# Patient Record
Sex: MALE | Race: WHITE | NOT HISPANIC OR LATINO | Employment: UNEMPLOYED | URBAN - METROPOLITAN AREA
[De-identification: names, ages, dates, MRNs, and addresses within clinical notes are randomized per-mention and may not be internally consistent; named-entity substitution may affect disease eponyms.]

---

## 2018-12-06 ENCOUNTER — HOSPITAL ENCOUNTER (EMERGENCY)
Facility: MEDICAL CENTER | Age: 58
End: 2018-12-06
Attending: EMERGENCY MEDICINE
Payer: MEDICAID

## 2018-12-06 VITALS
WEIGHT: 222 LBS | HEIGHT: 68 IN | HEART RATE: 90 BPM | TEMPERATURE: 97.3 F | OXYGEN SATURATION: 95 % | RESPIRATION RATE: 18 BRPM | SYSTOLIC BLOOD PRESSURE: 126 MMHG | BODY MASS INDEX: 33.65 KG/M2 | DIASTOLIC BLOOD PRESSURE: 91 MMHG

## 2018-12-06 DIAGNOSIS — J02.9 PHARYNGITIS, UNSPECIFIED ETIOLOGY: ICD-10-CM

## 2018-12-06 LAB
S PYO AG THROAT QL: NORMAL
SIGNIFICANT IND 70042: NORMAL
SITE SITE: NORMAL
SOURCE SOURCE: NORMAL

## 2018-12-06 PROCEDURE — A9270 NON-COVERED ITEM OR SERVICE: HCPCS | Performed by: EMERGENCY MEDICINE

## 2018-12-06 PROCEDURE — 87880 STREP A ASSAY W/OPTIC: CPT

## 2018-12-06 PROCEDURE — 87077 CULTURE AEROBIC IDENTIFY: CPT

## 2018-12-06 PROCEDURE — 700102 HCHG RX REV CODE 250 W/ 637 OVERRIDE(OP): Performed by: EMERGENCY MEDICINE

## 2018-12-06 PROCEDURE — 99284 EMERGENCY DEPT VISIT MOD MDM: CPT

## 2018-12-06 PROCEDURE — 96372 THER/PROPH/DIAG INJ SC/IM: CPT

## 2018-12-06 PROCEDURE — 700111 HCHG RX REV CODE 636 W/ 250 OVERRIDE (IP): Performed by: EMERGENCY MEDICINE

## 2018-12-06 PROCEDURE — 87081 CULTURE SCREEN ONLY: CPT

## 2018-12-06 RX ORDER — DEXAMETHASONE 4 MG/1
8 TABLET ORAL ONCE
Status: COMPLETED | OUTPATIENT
Start: 2018-12-06 | End: 2018-12-06

## 2018-12-06 RX ADMIN — PENICILLIN G BENZATHINE 1.2 MILLION UNITS: 1200000 INJECTION, SUSPENSION INTRAMUSCULAR at 11:52

## 2018-12-06 RX ADMIN — DEXAMETHASONE 8 MG: 4 TABLET ORAL at 11:52

## 2018-12-06 NOTE — DISCHARGE INSTRUCTIONS
Pharyngitis  Pharyngitis is redness, pain, and swelling (inflammation) of your pharynx.  What are the causes?  Pharyngitis is usually caused by infection. Most of the time, these infections are from viruses (viral) and are part of a cold. However, sometimes pharyngitis is caused by bacteria (bacterial). Pharyngitis can also be caused by allergies. Viral pharyngitis may be spread from person to person by coughing, sneezing, and personal items or utensils (cups, forks, spoons, toothbrushes). Bacterial pharyngitis may be spread from person to person by more intimate contact, such as kissing.  What are the signs or symptoms?  Symptoms of pharyngitis include:  · Sore throat.  · Tiredness (fatigue).  · Low-grade fever.  · Headache.  · Joint pain and muscle aches.  · Skin rashes.  · Swollen lymph nodes.  · Plaque-like film on throat or tonsils (often seen with bacterial pharyngitis).  How is this diagnosed?  Your health care provider will ask you questions about your illness and your symptoms. Your medical history, along with a physical exam, is often all that is needed to diagnose pharyngitis. Sometimes, a rapid strep test is done. Other lab tests may also be done, depending on the suspected cause.  How is this treated?  Viral pharyngitis will usually get better in 3-4 days without the use of medicine. Bacterial pharyngitis is treated with medicines that kill germs (antibiotics).  Follow these instructions at home:  · Drink enough water and fluids to keep your urine clear or pale yellow.  · Only take over-the-counter or prescription medicines as directed by your health care provider:  ¨ If you are prescribed antibiotics, make sure you finish them even if you start to feel better.  ¨ Do not take aspirin.  · Get lots of rest.  · Gargle with 8 oz of salt water (½ tsp of salt per 1 qt of water) as often as every 1-2 hours to soothe your throat.  · Throat lozenges (if you are not at risk for choking) or sprays may be used to  soothe your throat.  Contact a health care provider if:  · You have large, tender lumps in your neck.  · You have a rash.  · You cough up green, yellow-brown, or bloody spit.  Get help right away if:  · Your neck becomes stiff.  · You drool or are unable to swallow liquids.  · You vomit or are unable to keep medicines or liquids down.  · You have severe pain that does not go away with the use of recommended medicines.  · You have trouble breathing (not caused by a stuffy nose).  This information is not intended to replace advice given to you by your health care provider. Make sure you discuss any questions you have with your health care provider.  Document Released: 12/18/2006 Document Revised: 05/25/2017 Document Reviewed: 08/25/2014  [employer] Interactive Patient Education © 2017 [employer]
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
## 2018-12-06 NOTE — LETTER
12/9/2018               Frederic You  [address] [place] [id number]        Dear Frederic (MR#[id number])    This letter is sent in regards to your, recent visit to the [facility] Emergency Department on 12/6/2018.  During the visit, tests were performed to assist the physician in a medical diagnosis.  A review of those tests requires that we notify you of the following:    Your throat culture was POSITIVE for a bacteria called Group C Streptococcus. The antibiotic administered to you, Penicillin G Benzathine, should be active to treat this bacteria. If you are not improving, or your signs worsen, then please seek medical attention.      Please feel free to contact me at the number below if you have any questions or concerns. Thank you for your cooperation in the matter.    Sincerely,  ED Culture Follow-Up Staff  Kenzie Griffin, PharmD    [facility], Emergency Department  [facility], [place] [id number] [phone]

## 2018-12-06 NOTE — ED NOTES
L side throat edematous, red. Pt states he has difficulty swallowing x 3 days. NAD noted. Denies difficulty breathing. Speaking in full sentences.

## 2018-12-09 LAB
S PYO SPEC QL CULT: ABNORMAL
S PYO SPEC QL CULT: ABNORMAL
SIGNIFICANT IND 70042: ABNORMAL
SITE SITE: ABNORMAL
SOURCE SOURCE: ABNORMAL

## 2018-12-09 NOTE — ED NOTES
"ED Positive Culture Follow-up/Notification Note:    Date: 12/9/18     Patient seen in the ED on 12/6/2018 for sore throat starting 5 days prior to presentation.   1. Pharyngitis, unspecified etiology       Patient received penicillin G benzathine 1.2 Million Units intramuscularly before discharge.  Discharge Medication List as of 12/6/2018 12:21 PM          Allergies: Patient has no known allergies.     Vitals:    12/06/18 0929 12/06/18 0930 12/06/18 1200   BP: 128/95  126/91   Pulse: 94  90   Resp: 18  18   Temp: (!) 35.2 °C (95.4 °F)  36.3 °C (97.3 °F)   TempSrc: Temporal     SpO2: 95%     Weight:  100.7 kg (222 lb 0.1 oz)    Height: 1.727 m (5' 8\")         Final cultures:   Results     Procedure Component Value Units Date/Time    RAPID STREP, CULT IF INDICATED (CULTURE IF NEGATIVE) [[id number]] Collected:  12/06/18 1030    Order Status:  Completed Specimen:  Throat from Throat Updated:  12/09/18 1230     Significant Indicator NEG     Source THRT     Site THROAT     Rapid Strep Screen Negative for Group A streptococcus.  A negative result may be obtained if the specimen is  inadequate or antigen concentration is below the  sensitivity of the test. This negative test will be followed  up with a culture as requested.      BETA STREP SCREEN (GP. A) [[id number]]  (Abnormal) Collected:  12/06/18 1030    Order Status:  Completed Specimen:  Throat Updated:  12/09/18 1230     Significant Indicator POS (POS)     Source THRT     Site THROAT     Beta Strep Screen Group A Growth noted after further incubation, see below for  organism identification.   (A)      Beta Streptococcus Group G  Moderate growth   (A)          Plan:   Appropriate antibiotic therapy prescribed. No changes required based upon culture result.  I sent letter to patient to notify of positive culture result and recommend additional medical treatment if the patient has not improved because this pathogen result is not always the true pathogen and can be " commensal francisco.     Kenzie Griffin, PharmD

## 2019-08-18 ENCOUNTER — HOSPITAL ENCOUNTER (EMERGENCY)
Facility: MEDICAL CENTER | Age: 59
End: 2019-08-18
Attending: EMERGENCY MEDICINE
Payer: MEDICAID

## 2019-08-18 VITALS
RESPIRATION RATE: 16 BRPM | HEIGHT: 68 IN | DIASTOLIC BLOOD PRESSURE: 88 MMHG | HEART RATE: 72 BPM | WEIGHT: 220.68 LBS | TEMPERATURE: 96.8 F | BODY MASS INDEX: 33.45 KG/M2 | OXYGEN SATURATION: 93 % | SYSTOLIC BLOOD PRESSURE: 152 MMHG

## 2019-08-18 DIAGNOSIS — J03.90 TONSILLITIS: ICD-10-CM

## 2019-08-18 PROCEDURE — 99283 EMERGENCY DEPT VISIT LOW MDM: CPT

## 2019-08-18 RX ORDER — PENICILLIN V POTASSIUM 500 MG/1
500 TABLET ORAL
Qty: 28 TAB | Refills: 0 | Status: SHIPPED | OUTPATIENT
Start: 2019-08-18 | End: 2019-08-25

## 2019-08-18 RX ORDER — PREDNISONE 20 MG/1
60 TABLET ORAL DAILY
Qty: 12 TAB | Refills: 0 | Status: SHIPPED | OUTPATIENT
Start: 2019-08-18 | End: 2019-08-22

## 2019-08-18 ASSESSMENT — LIFESTYLE VARIABLES: DO YOU DRINK ALCOHOL: NO

## 2019-08-18 NOTE — ED NOTES
Pt discharged home with prescriptions for Pen V K and Prednisone. Pt to  prescriptions at any pharmacy, Please follow up with PCP and please return to ED for any new or worsening symptoms. Pt's number was taken down for a follow up call.
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
## 2019-09-09 ENCOUNTER — APPOINTMENT (OUTPATIENT)
Dept: RADIOLOGY | Facility: MEDICAL CENTER | Age: 59
DRG: 872 | End: 2019-09-09
Attending: EMERGENCY MEDICINE
Payer: MEDICAID

## 2019-09-09 ENCOUNTER — HOSPITAL ENCOUNTER (INPATIENT)
Facility: MEDICAL CENTER | Age: 59
LOS: 3 days | DRG: 872 | End: 2019-09-12
Attending: EMERGENCY MEDICINE | Admitting: INTERNAL MEDICINE
Payer: MEDICAID

## 2019-09-09 DIAGNOSIS — J02.9 PHARYNGITIS, UNSPECIFIED ETIOLOGY: ICD-10-CM

## 2019-09-09 DIAGNOSIS — L03.115 CELLULITIS OF RIGHT LOWER EXTREMITY: ICD-10-CM

## 2019-09-09 DIAGNOSIS — A41.9 SEPSIS, DUE TO UNSPECIFIED ORGANISM: ICD-10-CM

## 2019-09-09 LAB
ALBUMIN SERPL BCP-MCNC: 3.5 G/DL (ref 3.2–4.9)
ALBUMIN/GLOB SERPL: 0.9 G/DL
ALP SERPL-CCNC: 50 U/L (ref 30–99)
ALT SERPL-CCNC: 17 U/L (ref 2–50)
ANION GAP SERPL CALC-SCNC: 9 MMOL/L (ref 0–11.9)
APPEARANCE UR: CLEAR
APPEARANCE UR: CLEAR
AST SERPL-CCNC: 12 U/L (ref 12–45)
BASOPHILS # BLD AUTO: 0.6 % (ref 0–1.8)
BASOPHILS # BLD: 0.1 K/UL (ref 0–0.12)
BILIRUB SERPL-MCNC: 0.4 MG/DL (ref 0.1–1.5)
BILIRUB UR QL STRIP.AUTO: NEGATIVE
BILIRUB UR QL STRIP.AUTO: NEGATIVE
BUN SERPL-MCNC: 14 MG/DL (ref 8–22)
CALCIUM SERPL-MCNC: 9.4 MG/DL (ref 8.5–10.5)
CHLORIDE SERPL-SCNC: 103 MMOL/L (ref 96–112)
CO2 SERPL-SCNC: 26 MMOL/L (ref 20–33)
COLOR UR: YELLOW
COLOR UR: YELLOW
CREAT SERPL-MCNC: 1 MG/DL (ref 0.5–1.4)
EOSINOPHIL # BLD AUTO: 0.16 K/UL (ref 0–0.51)
EOSINOPHIL NFR BLD: 1 % (ref 0–6.9)
ERYTHROCYTE [DISTWIDTH] IN BLOOD BY AUTOMATED COUNT: 46.9 FL (ref 35.9–50)
GLOBULIN SER CALC-MCNC: 3.9 G/DL (ref 1.9–3.5)
GLUCOSE SERPL-MCNC: 97 MG/DL (ref 65–99)
GLUCOSE UR STRIP.AUTO-MCNC: NEGATIVE MG/DL
GLUCOSE UR STRIP.AUTO-MCNC: NEGATIVE MG/DL
HCT VFR BLD AUTO: 45.6 % (ref 42–52)
HGB BLD-MCNC: 14.4 G/DL (ref 14–18)
IMM GRANULOCYTES # BLD AUTO: 0.27 K/UL (ref 0–0.11)
IMM GRANULOCYTES NFR BLD AUTO: 1.7 % (ref 0–0.9)
INR PPP: 1.01 (ref 0.87–1.13)
KETONES UR STRIP.AUTO-MCNC: NEGATIVE MG/DL
KETONES UR STRIP.AUTO-MCNC: NEGATIVE MG/DL
LACTATE BLD-SCNC: 1.6 MMOL/L (ref 0.5–2)
LEUKOCYTE ESTERASE UR QL STRIP.AUTO: NEGATIVE
LEUKOCYTE ESTERASE UR QL STRIP.AUTO: NEGATIVE
LYMPHOCYTES # BLD AUTO: 2.11 K/UL (ref 1–4.8)
LYMPHOCYTES NFR BLD: 13.6 % (ref 22–41)
MCH RBC QN AUTO: 30.4 PG (ref 27–33)
MCHC RBC AUTO-ENTMCNC: 31.6 G/DL (ref 33.7–35.3)
MCV RBC AUTO: 96.2 FL (ref 81.4–97.8)
MICRO URNS: ABNORMAL
MICRO URNS: NORMAL
MONOCYTES # BLD AUTO: 1.16 K/UL (ref 0–0.85)
MONOCYTES NFR BLD AUTO: 7.5 % (ref 0–13.4)
NEUTROPHILS # BLD AUTO: 11.71 K/UL (ref 1.82–7.42)
NEUTROPHILS NFR BLD: 75.6 % (ref 44–72)
NITRITE UR QL STRIP.AUTO: NEGATIVE
NITRITE UR QL STRIP.AUTO: NEGATIVE
NRBC # BLD AUTO: 0 K/UL
NRBC BLD-RTO: 0 /100 WBC
PH UR STRIP.AUTO: 5 [PH] (ref 5–8)
PH UR STRIP.AUTO: 5.5 [PH] (ref 5–8)
PLATELET # BLD AUTO: 489 K/UL (ref 164–446)
PMV BLD AUTO: 9.7 FL (ref 9–12.9)
POTASSIUM SERPL-SCNC: 4.6 MMOL/L (ref 3.6–5.5)
PROT SERPL-MCNC: 7.4 G/DL (ref 6–8.2)
PROT UR QL STRIP: NEGATIVE MG/DL
PROT UR QL STRIP: NEGATIVE MG/DL
PROTHROMBIN TIME: 13.6 SEC (ref 12–14.6)
RBC # BLD AUTO: 4.74 M/UL (ref 4.7–6.1)
RBC UR QL AUTO: NEGATIVE
RBC UR QL AUTO: NEGATIVE
S PYO DNA SPEC NAA+PROBE: NOT DETECTED
SODIUM SERPL-SCNC: 138 MMOL/L (ref 135–145)
SP GR UR STRIP.AUTO: <=1.005
SP GR UR STRIP.AUTO: >=1.045
UROBILINOGEN UR STRIP.AUTO-MCNC: 0.2 MG/DL
UROBILINOGEN UR STRIP.AUTO-MCNC: 0.2 MG/DL
WBC # BLD AUTO: 15.5 K/UL (ref 4.8–10.8)

## 2019-09-09 PROCEDURE — 85610 PROTHROMBIN TIME: CPT

## 2019-09-09 PROCEDURE — 83605 ASSAY OF LACTIC ACID: CPT

## 2019-09-09 PROCEDURE — 85025 COMPLETE CBC W/AUTO DIFF WBC: CPT

## 2019-09-09 PROCEDURE — 700102 HCHG RX REV CODE 250 W/ 637 OVERRIDE(OP): Performed by: INTERNAL MEDICINE

## 2019-09-09 PROCEDURE — 96366 THER/PROPH/DIAG IV INF ADDON: CPT

## 2019-09-09 PROCEDURE — 99223 1ST HOSP IP/OBS HIGH 75: CPT | Mod: 25 | Performed by: INTERNAL MEDICINE

## 2019-09-09 PROCEDURE — 96365 THER/PROPH/DIAG IV INF INIT: CPT

## 2019-09-09 PROCEDURE — 70491 CT SOFT TISSUE NECK W/DYE: CPT

## 2019-09-09 PROCEDURE — 87040 BLOOD CULTURE FOR BACTERIA: CPT | Mod: 91

## 2019-09-09 PROCEDURE — 700111 HCHG RX REV CODE 636 W/ 250 OVERRIDE (IP): Performed by: EMERGENCY MEDICINE

## 2019-09-09 PROCEDURE — 700117 HCHG RX CONTRAST REV CODE 255: Performed by: EMERGENCY MEDICINE

## 2019-09-09 PROCEDURE — 36415 COLL VENOUS BLD VENIPUNCTURE: CPT

## 2019-09-09 PROCEDURE — 71045 X-RAY EXAM CHEST 1 VIEW: CPT

## 2019-09-09 PROCEDURE — 700105 HCHG RX REV CODE 258: Performed by: INTERNAL MEDICINE

## 2019-09-09 PROCEDURE — 99406 BEHAV CHNG SMOKING 3-10 MIN: CPT | Performed by: INTERNAL MEDICINE

## 2019-09-09 PROCEDURE — 99285 EMERGENCY DEPT VISIT HI MDM: CPT

## 2019-09-09 PROCEDURE — 80053 COMPREHEN METABOLIC PANEL: CPT

## 2019-09-09 PROCEDURE — 96368 THER/DIAG CONCURRENT INF: CPT

## 2019-09-09 PROCEDURE — 87086 URINE CULTURE/COLONY COUNT: CPT

## 2019-09-09 PROCEDURE — 700105 HCHG RX REV CODE 258: Performed by: EMERGENCY MEDICINE

## 2019-09-09 PROCEDURE — 770006 HCHG ROOM/CARE - MED/SURG/GYN SEMI*

## 2019-09-09 PROCEDURE — A9270 NON-COVERED ITEM OR SERVICE: HCPCS | Performed by: INTERNAL MEDICINE

## 2019-09-09 PROCEDURE — 93971 EXTREMITY STUDY: CPT | Mod: RT

## 2019-09-09 PROCEDURE — 81003 URINALYSIS AUTO W/O SCOPE: CPT

## 2019-09-09 PROCEDURE — 87651 STREP A DNA AMP PROBE: CPT

## 2019-09-09 RX ORDER — ONDANSETRON 2 MG/ML
4 INJECTION INTRAMUSCULAR; INTRAVENOUS EVERY 4 HOURS PRN
Status: DISCONTINUED | OUTPATIENT
Start: 2019-09-09 | End: 2019-09-12 | Stop reason: HOSPADM

## 2019-09-09 RX ORDER — OXYCODONE HYDROCHLORIDE 5 MG/1
5 TABLET ORAL
Status: DISCONTINUED | OUTPATIENT
Start: 2019-09-09 | End: 2019-09-12 | Stop reason: HOSPADM

## 2019-09-09 RX ORDER — NAPROXEN 500 MG/1
500 TABLET ORAL 2 TIMES DAILY
Refills: 0 | Status: ON HOLD | COMMUNITY
Start: 2019-08-21 | End: 2019-09-12

## 2019-09-09 RX ORDER — HYDROMORPHONE HYDROCHLORIDE 1 MG/ML
0.5 INJECTION, SOLUTION INTRAMUSCULAR; INTRAVENOUS; SUBCUTANEOUS
Status: DISCONTINUED | OUTPATIENT
Start: 2019-09-09 | End: 2019-09-12 | Stop reason: HOSPADM

## 2019-09-09 RX ORDER — TIOTROPIUM BROMIDE INHALATION SPRAY 3.12 UG/1
2.5 SPRAY, METERED RESPIRATORY (INHALATION) DAILY
Refills: 2 | Status: ON HOLD | COMMUNITY
Start: 2019-06-19 | End: 2019-09-12 | Stop reason: SDUPTHER

## 2019-09-09 RX ORDER — RANITIDINE 150 MG/1
150 TABLET ORAL 2 TIMES DAILY
Refills: 0 | Status: ON HOLD | COMMUNITY
Start: 2019-08-21 | End: 2019-09-12

## 2019-09-09 RX ORDER — SODIUM CHLORIDE, SODIUM LACTATE, POTASSIUM CHLORIDE, AND CALCIUM CHLORIDE .6; .31; .03; .02 G/100ML; G/100ML; G/100ML; G/100ML
30 INJECTION, SOLUTION INTRAVENOUS
Status: COMPLETED | OUTPATIENT
Start: 2019-09-09 | End: 2019-09-09

## 2019-09-09 RX ORDER — BISACODYL 10 MG
10 SUPPOSITORY, RECTAL RECTAL
Status: DISCONTINUED | OUTPATIENT
Start: 2019-09-09 | End: 2019-09-12 | Stop reason: HOSPADM

## 2019-09-09 RX ORDER — RANITIDINE 150 MG/1
150 TABLET ORAL 2 TIMES DAILY
Status: DISCONTINUED | OUTPATIENT
Start: 2019-09-09 | End: 2019-09-09

## 2019-09-09 RX ORDER — SODIUM CHLORIDE 9 MG/ML
INJECTION, SOLUTION INTRAVENOUS CONTINUOUS
Status: DISCONTINUED | OUTPATIENT
Start: 2019-09-09 | End: 2019-09-10

## 2019-09-09 RX ORDER — PROCHLORPERAZINE EDISYLATE 5 MG/ML
5-10 INJECTION INTRAMUSCULAR; INTRAVENOUS EVERY 4 HOURS PRN
Status: DISCONTINUED | OUTPATIENT
Start: 2019-09-09 | End: 2019-09-12 | Stop reason: HOSPADM

## 2019-09-09 RX ORDER — TIOTROPIUM BROMIDE 18 UG/1
1 CAPSULE ORAL; RESPIRATORY (INHALATION)
Status: DISCONTINUED | OUTPATIENT
Start: 2019-09-10 | End: 2019-09-12 | Stop reason: HOSPADM

## 2019-09-09 RX ORDER — POLYETHYLENE GLYCOL 3350 17 G/17G
1 POWDER, FOR SOLUTION ORAL
Status: DISCONTINUED | OUTPATIENT
Start: 2019-09-09 | End: 2019-09-12 | Stop reason: HOSPADM

## 2019-09-09 RX ORDER — NICOTINE 21 MG/24HR
21 PATCH, TRANSDERMAL 24 HOURS TRANSDERMAL
Status: DISCONTINUED | OUTPATIENT
Start: 2019-09-10 | End: 2019-09-12 | Stop reason: HOSPADM

## 2019-09-09 RX ORDER — SODIUM CHLORIDE, SODIUM LACTATE, POTASSIUM CHLORIDE, AND CALCIUM CHLORIDE .6; .31; .03; .02 G/100ML; G/100ML; G/100ML; G/100ML
30 INJECTION, SOLUTION INTRAVENOUS
Status: DISCONTINUED | OUTPATIENT
Start: 2019-09-09 | End: 2019-09-10

## 2019-09-09 RX ORDER — AMOXICILLIN 250 MG
2 CAPSULE ORAL 2 TIMES DAILY
Status: DISCONTINUED | OUTPATIENT
Start: 2019-09-09 | End: 2019-09-12 | Stop reason: HOSPADM

## 2019-09-09 RX ORDER — PROMETHAZINE HYDROCHLORIDE 25 MG/1
12.5-25 SUPPOSITORY RECTAL EVERY 4 HOURS PRN
Status: DISCONTINUED | OUTPATIENT
Start: 2019-09-09 | End: 2019-09-12 | Stop reason: HOSPADM

## 2019-09-09 RX ORDER — OXYCODONE HYDROCHLORIDE 10 MG/1
10 TABLET ORAL
Status: DISCONTINUED | OUTPATIENT
Start: 2019-09-09 | End: 2019-09-12 | Stop reason: HOSPADM

## 2019-09-09 RX ORDER — ALBUTEROL SULFATE 90 UG/1
1-2 AEROSOL, METERED RESPIRATORY (INHALATION) EVERY 4 HOURS PRN
Refills: 1 | Status: ON HOLD | COMMUNITY
Start: 2019-06-19 | End: 2019-09-12 | Stop reason: SDUPTHER

## 2019-09-09 RX ORDER — FAMOTIDINE 20 MG/1
20 TABLET, FILM COATED ORAL 2 TIMES DAILY
Status: DISCONTINUED | OUTPATIENT
Start: 2019-09-09 | End: 2019-09-12 | Stop reason: HOSPADM

## 2019-09-09 RX ORDER — PROMETHAZINE HYDROCHLORIDE 25 MG/1
12.5-25 TABLET ORAL EVERY 4 HOURS PRN
Status: DISCONTINUED | OUTPATIENT
Start: 2019-09-09 | End: 2019-09-12 | Stop reason: HOSPADM

## 2019-09-09 RX ORDER — SODIUM CHLORIDE, SODIUM LACTATE, POTASSIUM CHLORIDE, AND CALCIUM CHLORIDE .6; .31; .03; .02 G/100ML; G/100ML; G/100ML; G/100ML
1000 INJECTION, SOLUTION INTRAVENOUS
Status: DISCONTINUED | OUTPATIENT
Start: 2019-09-09 | End: 2019-09-12 | Stop reason: HOSPADM

## 2019-09-09 RX ORDER — ONDANSETRON 4 MG/1
4 TABLET, ORALLY DISINTEGRATING ORAL EVERY 4 HOURS PRN
Status: DISCONTINUED | OUTPATIENT
Start: 2019-09-09 | End: 2019-09-12 | Stop reason: HOSPADM

## 2019-09-09 RX ORDER — ENALAPRILAT 1.25 MG/ML
1.25 INJECTION INTRAVENOUS EVERY 6 HOURS PRN
Status: DISCONTINUED | OUTPATIENT
Start: 2019-09-09 | End: 2019-09-12 | Stop reason: HOSPADM

## 2019-09-09 RX ORDER — ACETAMINOPHEN 325 MG/1
650 TABLET ORAL EVERY 6 HOURS PRN
Status: DISCONTINUED | OUTPATIENT
Start: 2019-09-09 | End: 2019-09-12 | Stop reason: HOSPADM

## 2019-09-09 RX ADMIN — SODIUM CHLORIDE: 9 INJECTION, SOLUTION INTRAVENOUS at 22:18

## 2019-09-09 RX ADMIN — SENNOSIDES, DOCUSATE SODIUM 2 TABLET: 50; 8.6 TABLET, FILM COATED ORAL at 22:14

## 2019-09-09 RX ADMIN — IOHEXOL 100 ML: 350 INJECTION, SOLUTION INTRAVENOUS at 19:00

## 2019-09-09 RX ADMIN — VANCOMYCIN HYDROCHLORIDE 2400 MG: 500 INJECTION, POWDER, LYOPHILIZED, FOR SOLUTION INTRAVENOUS at 18:45

## 2019-09-09 RX ADMIN — SODIUM CHLORIDE 3 G: 900 INJECTION INTRAVENOUS at 18:36

## 2019-09-09 RX ADMIN — SODIUM CHLORIDE, POTASSIUM CHLORIDE, SODIUM LACTATE AND CALCIUM CHLORIDE 2898 ML: 600; 310; 30; 20 INJECTION, SOLUTION INTRAVENOUS at 18:35

## 2019-09-09 SDOH — HEALTH STABILITY: MENTAL HEALTH: HOW OFTEN DO YOU HAVE 6 OR MORE DRINKS ON ONE OCCASION?: MONTHLY

## 2019-09-09 SDOH — HEALTH STABILITY: MENTAL HEALTH: HOW MANY STANDARD DRINKS CONTAINING ALCOHOL DO YOU HAVE ON A TYPICAL DAY?: 1 OR 2

## 2019-09-09 SDOH — HEALTH STABILITY: MENTAL HEALTH: HOW OFTEN DO YOU HAVE A DRINK CONTAINING ALCOHOL?: 2-3 TIMES A WEEK

## 2019-09-09 ASSESSMENT — ENCOUNTER SYMPTOMS
FEVER: 1
CHILLS: 1
COUGH: 0
MYALGIAS: 0
STRIDOR: 0
ABDOMINAL PAIN: 0
TINGLING: 0
DIARRHEA: 0
SPUTUM PRODUCTION: 0
WEAKNESS: 0
HEADACHES: 0
PALPITATIONS: 0
CONSTIPATION: 0
DEPRESSION: 0
DIZZINESS: 0
VOMITING: 0
LOSS OF CONSCIOUSNESS: 0
NAUSEA: 0
FALLS: 0
SHORTNESS OF BREATH: 0

## 2019-09-09 ASSESSMENT — LIFESTYLE VARIABLES
EVER HAD A DRINK FIRST THING IN THE MORNING TO STEADY YOUR NERVES TO GET RID OF A HANGOVER: YES
TOTAL SCORE: 2
TOTAL SCORE: 2
ON A TYPICAL DAY WHEN YOU DRINK ALCOHOL HOW MANY DRINKS DO YOU HAVE: 5
HAVE PEOPLE ANNOYED YOU BY CRITICIZING YOUR DRINKING: NO
CONSUMPTION TOTAL: POSITIVE
HOW MANY TIMES IN THE PAST YEAR HAVE YOU HAD 5 OR MORE DRINKS IN A DAY: 50
DOES PATIENT WANT TO STOP DRINKING: NO
EVER_SMOKED: YES
EVER_SMOKED: YES
AVERAGE NUMBER OF DAYS PER WEEK YOU HAVE A DRINK CONTAINING ALCOHOL: 2
ALCOHOL_USE: YES
EVER FELT BAD OR GUILTY ABOUT YOUR DRINKING: NO
TOTAL SCORE: 2
HAVE YOU EVER FELT YOU SHOULD CUT DOWN ON YOUR DRINKING: YES

## 2019-09-09 ASSESSMENT — COGNITIVE AND FUNCTIONAL STATUS - GENERAL
SUGGESTED CMS G CODE MODIFIER DAILY ACTIVITY: CH
DAILY ACTIVITIY SCORE: 24
MOBILITY SCORE: 24
SUGGESTED CMS G CODE MODIFIER MOBILITY: CH

## 2019-09-09 ASSESSMENT — COPD QUESTIONNAIRES
HAVE YOU SMOKED AT LEAST 100 CIGARETTES IN YOUR ENTIRE LIFE: YES
DURING THE PAST 4 WEEKS HOW MUCH DID YOU FEEL SHORT OF BREATH: SOME OF THE TIME
DO YOU EVER COUGH UP ANY MUCUS OR PHLEGM?: YES, A FEW DAYS A WEEK OR MONTH
COPD SCREENING SCORE: 6
IN THE PAST 12 MONTHS DO YOU DO LESS THAN YOU USED TO BECAUSE OF YOUR BREATHING PROBLEMS: AGREE

## 2019-09-09 ASSESSMENT — PATIENT HEALTH QUESTIONNAIRE - PHQ9
SUM OF ALL RESPONSES TO PHQ9 QUESTIONS 1 AND 2: 0
2. FEELING DOWN, DEPRESSED, IRRITABLE, OR HOPELESS: NOT AT ALL
1. LITTLE INTEREST OR PLEASURE IN DOING THINGS: NOT AT ALL

## 2019-09-09 NOTE — ED TRIAGE NOTES
Chief Complaint   Patient presents with   • Leg Swelling     right, +redness x >1wk   • Sore Throat     Pt ambulated to triage, here for right leg swelling,redness and pain. Pt said started on wound back of his leg.   He is also having intermittent sore throat.

## 2019-09-10 PROBLEM — J02.9 PHARYNGITIS: Status: ACTIVE | Noted: 2019-09-10

## 2019-09-10 PROBLEM — L03.90 CELLULITIS: Status: ACTIVE | Noted: 2019-09-10

## 2019-09-10 PROBLEM — K21.9 GERD (GASTROESOPHAGEAL REFLUX DISEASE): Status: ACTIVE | Noted: 2019-09-10

## 2019-09-10 PROBLEM — D75.839 THROMBOCYTOSIS: Status: ACTIVE | Noted: 2019-09-10

## 2019-09-10 PROBLEM — A41.9 SEPSIS (HCC): Status: ACTIVE | Noted: 2019-09-10

## 2019-09-10 PROBLEM — Z72.0 TOBACCO ABUSE: Status: ACTIVE | Noted: 2019-09-10

## 2019-09-10 LAB
ANION GAP SERPL CALC-SCNC: 8 MMOL/L (ref 0–11.9)
BASOPHILS # BLD AUTO: 0.7 % (ref 0–1.8)
BASOPHILS # BLD: 0.09 K/UL (ref 0–0.12)
BUN SERPL-MCNC: 11 MG/DL (ref 8–22)
CALCIUM SERPL-MCNC: 8.7 MG/DL (ref 8.5–10.5)
CHLORIDE SERPL-SCNC: 105 MMOL/L (ref 96–112)
CO2 SERPL-SCNC: 25 MMOL/L (ref 20–33)
CREAT SERPL-MCNC: 0.85 MG/DL (ref 0.5–1.4)
EOSINOPHIL # BLD AUTO: 0.24 K/UL (ref 0–0.51)
EOSINOPHIL NFR BLD: 1.7 % (ref 0–6.9)
ERYTHROCYTE [DISTWIDTH] IN BLOOD BY AUTOMATED COUNT: 46.8 FL (ref 35.9–50)
GLUCOSE SERPL-MCNC: 102 MG/DL (ref 65–99)
HCT VFR BLD AUTO: 41.9 % (ref 42–52)
HGB BLD-MCNC: 13 G/DL (ref 14–18)
IMM GRANULOCYTES # BLD AUTO: 0.16 K/UL (ref 0–0.11)
IMM GRANULOCYTES NFR BLD AUTO: 1.2 % (ref 0–0.9)
LACTATE BLD-SCNC: 1.1 MMOL/L (ref 0.5–2)
LACTATE BLD-SCNC: 2 MMOL/L (ref 0.5–2)
LYMPHOCYTES # BLD AUTO: 2.28 K/UL (ref 1–4.8)
LYMPHOCYTES NFR BLD: 16.5 % (ref 22–41)
MCH RBC QN AUTO: 30 PG (ref 27–33)
MCHC RBC AUTO-ENTMCNC: 31 G/DL (ref 33.7–35.3)
MCV RBC AUTO: 96.8 FL (ref 81.4–97.8)
MONOCYTES # BLD AUTO: 1.22 K/UL (ref 0–0.85)
MONOCYTES NFR BLD AUTO: 8.8 % (ref 0–13.4)
NEUTROPHILS # BLD AUTO: 9.83 K/UL (ref 1.82–7.42)
NEUTROPHILS NFR BLD: 71.1 % (ref 44–72)
NRBC # BLD AUTO: 0 K/UL
NRBC BLD-RTO: 0 /100 WBC
PLATELET # BLD AUTO: 455 K/UL (ref 164–446)
PMV BLD AUTO: 9.4 FL (ref 9–12.9)
POTASSIUM SERPL-SCNC: 4.1 MMOL/L (ref 3.6–5.5)
RBC # BLD AUTO: 4.33 M/UL (ref 4.7–6.1)
SODIUM SERPL-SCNC: 138 MMOL/L (ref 135–145)
WBC # BLD AUTO: 13.8 K/UL (ref 4.8–10.8)

## 2019-09-10 PROCEDURE — 99232 SBSQ HOSP IP/OBS MODERATE 35: CPT | Performed by: HOSPITALIST

## 2019-09-10 PROCEDURE — 85025 COMPLETE CBC W/AUTO DIFF WBC: CPT

## 2019-09-10 PROCEDURE — 770006 HCHG ROOM/CARE - MED/SURG/GYN SEMI*

## 2019-09-10 PROCEDURE — 700111 HCHG RX REV CODE 636 W/ 250 OVERRIDE (IP): Performed by: INTERNAL MEDICINE

## 2019-09-10 PROCEDURE — A9270 NON-COVERED ITEM OR SERVICE: HCPCS | Performed by: INTERNAL MEDICINE

## 2019-09-10 PROCEDURE — 83605 ASSAY OF LACTIC ACID: CPT | Mod: 91

## 2019-09-10 PROCEDURE — 80048 BASIC METABOLIC PNL TOTAL CA: CPT

## 2019-09-10 PROCEDURE — 700102 HCHG RX REV CODE 250 W/ 637 OVERRIDE(OP): Performed by: INTERNAL MEDICINE

## 2019-09-10 PROCEDURE — 36415 COLL VENOUS BLD VENIPUNCTURE: CPT

## 2019-09-10 PROCEDURE — 700105 HCHG RX REV CODE 258: Performed by: INTERNAL MEDICINE

## 2019-09-10 RX ADMIN — AMPICILLIN AND SULBACTAM 3 G: 2; 1 INJECTION, POWDER, FOR SOLUTION INTRAVENOUS at 12:25

## 2019-09-10 RX ADMIN — AMPICILLIN AND SULBACTAM 3 G: 2; 1 INJECTION, POWDER, FOR SOLUTION INTRAVENOUS at 17:36

## 2019-09-10 RX ADMIN — AMPICILLIN AND SULBACTAM 3 G: 2; 1 INJECTION, POWDER, FOR SOLUTION INTRAVENOUS at 00:13

## 2019-09-10 RX ADMIN — FAMOTIDINE 20 MG: 20 TABLET ORAL at 17:36

## 2019-09-10 RX ADMIN — FAMOTIDINE 20 MG: 20 TABLET ORAL at 04:40

## 2019-09-10 RX ADMIN — NICOTINE TRANSDERMAL SYSTEM 21 MG: 21 PATCH, EXTENDED RELEASE TRANSDERMAL at 04:40

## 2019-09-10 RX ADMIN — AMPICILLIN AND SULBACTAM 3 G: 2; 1 INJECTION, POWDER, FOR SOLUTION INTRAVENOUS at 06:25

## 2019-09-10 RX ADMIN — SENNOSIDES, DOCUSATE SODIUM 2 TABLET: 50; 8.6 TABLET, FILM COATED ORAL at 04:40

## 2019-09-10 RX ADMIN — TIOTROPIUM BROMIDE 1 CAPSULE: 18 CAPSULE ORAL; RESPIRATORY (INHALATION) at 12:25

## 2019-09-10 RX ADMIN — FAMOTIDINE 20 MG: 20 TABLET ORAL at 00:13

## 2019-09-10 ASSESSMENT — ENCOUNTER SYMPTOMS
CONSTITUTIONAL NEGATIVE: 1
GASTROINTESTINAL NEGATIVE: 1
PSYCHIATRIC NEGATIVE: 1
MYALGIAS: 1
RESPIRATORY NEGATIVE: 1
NEUROLOGICAL NEGATIVE: 1
SORE THROAT: 1

## 2019-09-10 NOTE — ASSESSMENT & PLAN NOTE
-Significant cellulitis involving the entire right leg  -He does have multiple scratches associated with the cellulitis but no true open wound  -Causing sepsis   IV Unasyn    Added po doxycycline on 9/11

## 2019-09-10 NOTE — ED NOTES
Pt ambulated from lobby. Slight gimp in gait noted. Pt presents for symptoms as stated in the triage note. RLE is red and swollen. Pt states symptoms have gotten worse x8 days since onset. Pt changed into gown.

## 2019-09-10 NOTE — ED NOTES
Med rec updated and complete. Allergies reviewed.   Met with pt ar bedside and dicussed current medications  No antibiotic use in last 14 days.  Home pharmacy community
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
## 2019-09-10 NOTE — H&P
Hospital Medicine History & Physical Note    Date of Service  9/9/2019    Primary Care Physician  DYLON Earl    Consultants  None    Code Status  Full    Chief Complaint  Right leg pain and swelling    History of Presenting Illness  58 y.o. male who presented 9/9/2019 with right leg cellulitis.  Patient states his symptoms initially started over a week ago.  He noted some scratches, itch them and then he began noticing that they developed erythema.  He states he later noted pus coming out of the wounds, then had pain and swelling which continued to worsen.  He described the pain as a sharp burning, 9/10 at its worse.  At times the pain is so severe he is unable to walk.  He denied weakness itch just due to the pain.  He states he was diagnosed with a throat infection, was on antibiotics approximately 3 weeks ago.  States it was a 4 time a day antibiotic, at times he would forget a dose.  Patient continues to have some throat pain as well as erythema, worse with swallowing.  He does complain of fever and chills.  Of note, multiple times during my exam he explained that he thinks this is from his cat.  I did discuss the case including labs and imaging with the ER physician.    Review of Systems  Review of Systems   Constitutional: Positive for chills and fever. Negative for malaise/fatigue.   HENT: Negative for congestion.    Respiratory: Negative for cough, sputum production, shortness of breath and stridor.    Cardiovascular: Negative for chest pain, palpitations and leg swelling.   Gastrointestinal: Negative for abdominal pain, constipation, diarrhea, nausea and vomiting.   Genitourinary: Negative for dysuria and urgency.   Musculoskeletal: Positive for joint pain (right leg). Negative for falls and myalgias.   Skin: Positive for rash.   Neurological: Negative for dizziness, tingling, loss of consciousness, weakness and headaches.   Psychiatric/Behavioral: Negative for depression and suicidal ideas.    All other systems reviewed and are negative.      Past Medical History   has a past medical history of Seizure (HCC) (2/2013) and Skull fractures (HCC).    Surgical History   has a past surgical history that includes inguinal hernia repair (Left, 6/25/2015).     Family History  Reviewed, nonpertinent    Social History   reports that he has been smoking cigarettes. He has a 38.00 pack-year smoking history. He has never used smokeless tobacco. He reports that he drinks alcohol. He reports that he does not use drugs.    Allergies  No Known Allergies    Medications  Prior to Admission Medications   Prescriptions Last Dose Informant Patient Reported? Taking?   SPIRIVA RESPIMAT 2.5 MCG/ACT Aero Soln 9/9/2019 at 1000 Patient Yes No   Sig: Inhale 2.5 mcg by mouth every day.   albuterol 108 (90 Base) MCG/ACT Aero Soln inhalation aerosol 9/9/2019 at 1100 Patient Yes No   Sig: Inhale 1-2 Puffs by mouth every four hours as needed.   fenofibrate (TRICOR) 145 MG TABS 9/9/2019 at 0930 Patient Yes No   Sig: Take 145 mg by mouth every day.   naproxen (NAPROSYN) 500 MG Tab 9/9/2019 at 0930 Patient Yes No   Sig: Take 500 mg by mouth 2 Times a Day.   raNITidine (ZANTAC) 150 MG Tab 9/9/2019 at 0930 Patient Yes No   Sig: Take 150 mg by mouth 2 Times a Day.      Facility-Administered Medications: None       Physical Exam  Temp:  [36.9 °C (98.4 °F)-37 °C (98.6 °F)] 37 °C (98.6 °F)  Pulse:  [70-98] 90  Resp:  [18] 18  BP: (114-137)/(73-86) 137/79  SpO2:  [94 %-98 %] 95 %    Physical Exam   Constitutional: He is oriented to person, place, and time. He appears well-developed and well-nourished.  Non-toxic appearance. No distress.   HENT:   Head: Normocephalic and atraumatic. Not macrocephalic and not microcephalic. Head is without raccoon's eyes and without Siegel's sign.   Right Ear: External ear normal.   Left Ear: External ear normal.   Mouth/Throat: Mucous membranes are dry. No oropharyngeal exudate.   Eyes: Conjunctivae are normal.  Right eye exhibits no discharge. Left eye exhibits no discharge. No scleral icterus.   Neck: Normal range of motion. Neck supple. No tracheal deviation, no edema and no erythema present.   Cardiovascular: Normal rate, regular rhythm, normal heart sounds and intact distal pulses. Exam reveals no gallop, no friction rub and no decreased pulses.   No murmur heard.  Pulmonary/Chest: Effort normal and breath sounds normal. No stridor. No respiratory distress. He has no decreased breath sounds. He has no wheezes. He has no rhonchi. He has no rales. He exhibits no tenderness.   Abdominal: Soft. Bowel sounds are normal. He exhibits no distension. There is no splenomegaly or hepatomegaly. There is no tenderness. There is no rebound and no guarding.   Musculoskeletal: Normal range of motion. He exhibits tenderness (right leg).   Lymphadenopathy:     He has no cervical adenopathy.   Neurological: He is alert and oriented to person, place, and time. No cranial nerve deficit. Coordination normal.   Skin: Skin is warm and dry. He is not diaphoretic. There is erythema (right leg). No cyanosis. No pallor. Nails show no clubbing.   Psychiatric: He has a normal mood and affect. His speech is normal and behavior is normal. Judgment and thought content normal. Cognition and memory are normal.   Nursing note and vitals reviewed.      Laboratory:  Recent Labs     09/09/19  1840   WBC 15.5*   RBC 4.74   HEMOGLOBIN 14.4   HEMATOCRIT 45.6   MCV 96.2   MCH 30.4   MCHC 31.6*   RDW 46.9   PLATELETCT 489*   MPV 9.7     Recent Labs     09/09/19  1840   SODIUM 138   POTASSIUM 4.6   CHLORIDE 103   CO2 26   GLUCOSE 97   BUN 14   CREATININE 1.00   CALCIUM 9.4     Recent Labs     09/09/19  1840   ALTSGPT 17   ASTSGOT 12   ALKPHOSPHAT 50   TBILIRUBIN 0.4   GLUCOSE 97         No results for input(s): NTPROBNP in the last 72 hours.      No results for input(s): TROPONINT in the last 72 hours.    Urinalysis:    Recent Labs     09/09/19  1950    SPECGRAVITY >=1.045*   GLUCOSEUR Negative   KETONES Negative   NITRITE Negative   LEUKESTERAS Negative        Imaging:  US-EXTREMITY VENOUS LOWER UNILAT RIGHT   Final Result      DX-CHEST-PORTABLE (1 VIEW)   Final Result      No acute cardiopulmonary abnormality.      CT-SOFT TISSUE NECK WITH   Final Result      1.  Soft tissue swelling and pharyngeal narrowing is noted which could indicate tonsillitis or pharyngitis.      2.  A few mildly enlarged lymph nodes are also identified in the region. Enlargement could be due to inflammation or infection or less likely neoplasm.            Assessment/Plan:  I anticipate this patient will require at least two midnights for appropriate medical management, necessitating inpatient admission.    * Cellulitis- (present on admission)  Assessment & Plan  -Significant cellulitis involving the entire right leg  -He does have multiple scratches associated with the cellulitis but no true open wound  -Causing sepsis  -Start IV Unasyn    Sepsis (HCC)- (present on admission)  Assessment & Plan  -This is sepsis (without associated acute organ dysfunction).   -Due to right leg cellulitis  -Start IV Unasyn  -Patient also with pharyngitis, no sign of abscess, will be covered with Unasyn  -Await culture results  -Sepsis order set has been initiated, patient will receive significant IV fluids  -Trend lactic acid  -Patient is currently hemodynamically stable    Pharyngitis  Assessment & Plan  -I did personally review the CT of the neck, noted inflammatory changes of pharyngitis, no sign of abscess  -Patient will be on Unasyn for his cellulitis which will cover his pharyngitis    Tobacco abuse- (present on admission)  Assessment & Plan  -Tobacco cessation counseling and education provided for more than 10 minutes. Nicotine replacement options provided including patch, and further medical treatments including Wellbutrin and chantix.  As well as over the counter options of lozenges and  gum  -Patient did request nicotine patch and gum    Thrombocytosis (HCC)- (present on admission)  Assessment & Plan  -Likely due to dehydration  -Start IV fluids  -Repeat CBC in the morning    GERD (gastroesophageal reflux disease)- (present on admission)  Assessment & Plan  -Continue home H2 blocker      VTE prophylaxis: Lovenox

## 2019-09-10 NOTE — ASSESSMENT & PLAN NOTE
-This is sepsis (without associated acute organ dysfunction).   -Due to right leg cellulitis   IV Unasyn  -Patient also with pharyngitis, no sign of abscess, will be covered with Unasyn

## 2019-09-10 NOTE — PROGRESS NOTES
Received report, assumed pt care. Pt a&o x 4, VSS, Assessment completed. Resting comfortably in bed with call light, bedside table in reach. No c/o at this time. R leg redness and swelling noted. Side rails up x 2. Instructed to use call light when needing to get OOB verbalized understanding. Bed alarm off, pt up self and steady, bed in low position. Will continue to monitor.

## 2019-09-10 NOTE — PROGRESS NOTES
[facility] Medicine Daily Progress Note    Date of Service  9/10/2019    Chief Complaint  58 y.o. male admitted 9/9/2019 with right leg swelling      Interval Problem Update  He still has significant edema in the right leg    There is no redness in the right leg.  Patient states that the redness is better than yesterday    Patient is on IV Unasyn    Afebrile    Patient was told to elevate his right leg    Doppler of the right maxilla evidence of DVT    Pt c/o sore throat    Consultants/Specialty  none    Code Status  full    Disposition  home    Review of Systems  Review of Systems   Constitutional: Negative.    HENT: Positive for sore throat.    Respiratory: Negative.    Cardiovascular: Positive for leg swelling.   Gastrointestinal: Negative.    Genitourinary: Negative.    Musculoskeletal: Positive for myalgias.   Neurological: Negative.    Psychiatric/Behavioral: Negative.         Physical Exam  Temp:  [36.5 °C (97.7 °F)-37.8 °C (100 °F)] 37 °C (98.6 °F)  Pulse:  [67-98] 67  Resp:  [17-20] 17  BP: (112-152)/(55-89) 112/72  SpO2:  [94 %-98 %] 94 %    Physical Exam   Constitutional: He is oriented to person, place, and time. He appears well-nourished. No distress.   HENT:   Head: Normocephalic and atraumatic.   Mouth/Throat: No oropharyngeal exudate.   Eyes: Left eye exhibits no discharge.   Neck: Neck supple. No JVD present. No tracheal deviation present. No thyromegaly present.   Cardiovascular: Normal rate, regular rhythm and intact distal pulses. Exam reveals no friction rub.   No murmur heard.  Pulmonary/Chest: Effort normal and breath sounds normal. No respiratory distress. He has no wheezes. He has no rales.   Abdominal: Soft. He exhibits distension. There is no tenderness. There is no rebound and no guarding.   Musculoskeletal: Normal range of motion. He exhibits edema.   Neurological: He is alert and oriented to person, place, and time. No cranial nerve deficit. Coordination normal.   Skin: No rash noted. He  is not diaphoretic. No erythema. No pallor.   Psychiatric: He has a normal mood and affect.       Fluids    Intake/Output Summary (Last 24 hours) at 9/10/2019 1339  Last data filed at 9/10/2019 0905  Gross per 24 hour   Intake 480 ml   Output 850 ml   Net -370 ml       Laboratory  Recent Labs     09/09/19 1840 09/10/19  0501   WBC 15.5* 13.8*   RBC 4.74 4.33*   HEMOGLOBIN 14.4 13.0*   HEMATOCRIT 45.6 41.9*   MCV 96.2 96.8   MCH 30.4 30.0   MCHC 31.6* 31.0*   RDW 46.9 46.8   PLATELETCT 489* 455*   MPV 9.7 9.4     Recent Labs     09/09/19  1840 09/10/19  0501   SODIUM 138 138   POTASSIUM 4.6 4.1   CHLORIDE 103 105   CO2 26 25   GLUCOSE 97 102*   BUN 14 11   CREATININE 1.00 0.85   CALCIUM 9.4 8.7     Recent Labs     09/09/19  1840   INR 1.01               Imaging  US-EXTREMITY VENOUS LOWER UNILAT RIGHT   Final Result      DX-CHEST-PORTABLE (1 VIEW)   Final Result      No acute cardiopulmonary abnormality.      CT-SOFT TISSUE NECK WITH   Final Result      1.  Soft tissue swelling and pharyngeal narrowing is noted which could indicate tonsillitis or pharyngitis.      2.  A few mildly enlarged lymph nodes are also identified in the region. Enlargement could be due to inflammation or infection or less likely neoplasm.           Assessment/Plan  * Cellulitis- (present on admission)  Assessment & Plan  -Significant cellulitis involving the entire right leg  -He does have multiple scratches associated with the cellulitis but no true open wound  -Causing sepsis   IV Unasyn    Sepsis (HCC)- (present on admission)  Assessment & Plan  -This is sepsis (without associated acute organ dysfunction).   -Due to right leg cellulitis   IV Unasyn  -Patient also with pharyngitis, no sign of abscess, will be covered with Unasyn      Pharyngitis  Assessment & Plan    -Patient will be on Unasyn for his cellulitis which will cover his pharyngitis    Tobacco abuse- (present on admission)  Assessment & Plan  -Tobacco cessation  advised    Thrombocytosis (HCC)- (present on admission)  Assessment & Plan  -Likely due to dehydration and hydration  Ok to heplock iv on 9/10  -Repeat CBC in the morning    GERD (gastroesophageal reflux disease)- (present on admission)  Assessment & Plan  -Continue home H2 blocker       VTE prophylaxis: lovenox    Check am cbc, bmp

## 2019-09-11 LAB
ANION GAP SERPL CALC-SCNC: 8 MMOL/L (ref 0–11.9)
BACTERIA UR CULT: NORMAL
BUN SERPL-MCNC: 10 MG/DL (ref 8–22)
CALCIUM SERPL-MCNC: 8.9 MG/DL (ref 8.5–10.5)
CHLORIDE SERPL-SCNC: 103 MMOL/L (ref 96–112)
CO2 SERPL-SCNC: 24 MMOL/L (ref 20–33)
CREAT SERPL-MCNC: 1.04 MG/DL (ref 0.5–1.4)
ERYTHROCYTE [DISTWIDTH] IN BLOOD BY AUTOMATED COUNT: 45.6 FL (ref 35.9–50)
GLUCOSE SERPL-MCNC: 141 MG/DL (ref 65–99)
HCT VFR BLD AUTO: 41.8 % (ref 42–52)
HGB BLD-MCNC: 13.1 G/DL (ref 14–18)
MCH RBC QN AUTO: 30 PG (ref 27–33)
MCHC RBC AUTO-ENTMCNC: 31.3 G/DL (ref 33.7–35.3)
MCV RBC AUTO: 95.9 FL (ref 81.4–97.8)
PLATELET # BLD AUTO: 464 K/UL (ref 164–446)
PMV BLD AUTO: 9.2 FL (ref 9–12.9)
POTASSIUM SERPL-SCNC: 3.9 MMOL/L (ref 3.6–5.5)
RBC # BLD AUTO: 4.36 M/UL (ref 4.7–6.1)
SIGNIFICANT IND 70042: NORMAL
SITE SITE: NORMAL
SODIUM SERPL-SCNC: 135 MMOL/L (ref 135–145)
SOURCE SOURCE: NORMAL
WBC # BLD AUTO: 12.1 K/UL (ref 4.8–10.8)

## 2019-09-11 PROCEDURE — 700105 HCHG RX REV CODE 258: Performed by: INTERNAL MEDICINE

## 2019-09-11 PROCEDURE — 36415 COLL VENOUS BLD VENIPUNCTURE: CPT

## 2019-09-11 PROCEDURE — 85027 COMPLETE CBC AUTOMATED: CPT

## 2019-09-11 PROCEDURE — 99232 SBSQ HOSP IP/OBS MODERATE 35: CPT | Performed by: HOSPITALIST

## 2019-09-11 PROCEDURE — 700111 HCHG RX REV CODE 636 W/ 250 OVERRIDE (IP): Performed by: INTERNAL MEDICINE

## 2019-09-11 PROCEDURE — A9270 NON-COVERED ITEM OR SERVICE: HCPCS | Performed by: INTERNAL MEDICINE

## 2019-09-11 PROCEDURE — 700102 HCHG RX REV CODE 250 W/ 637 OVERRIDE(OP): Performed by: INTERNAL MEDICINE

## 2019-09-11 PROCEDURE — 80048 BASIC METABOLIC PNL TOTAL CA: CPT

## 2019-09-11 PROCEDURE — 700102 HCHG RX REV CODE 250 W/ 637 OVERRIDE(OP): Performed by: HOSPITALIST

## 2019-09-11 PROCEDURE — 770006 HCHG ROOM/CARE - MED/SURG/GYN SEMI*

## 2019-09-11 PROCEDURE — A9270 NON-COVERED ITEM OR SERVICE: HCPCS | Performed by: HOSPITALIST

## 2019-09-11 RX ORDER — DOXYCYCLINE 100 MG/1
100 TABLET ORAL EVERY 12 HOURS
Status: DISCONTINUED | OUTPATIENT
Start: 2019-09-11 | End: 2019-09-12 | Stop reason: HOSPADM

## 2019-09-11 RX ADMIN — AMPICILLIN AND SULBACTAM 3 G: 2; 1 INJECTION, POWDER, FOR SOLUTION INTRAVENOUS at 23:23

## 2019-09-11 RX ADMIN — AMPICILLIN AND SULBACTAM 3 G: 2; 1 INJECTION, POWDER, FOR SOLUTION INTRAVENOUS at 17:54

## 2019-09-11 RX ADMIN — AMPICILLIN AND SULBACTAM 3 G: 2; 1 INJECTION, POWDER, FOR SOLUTION INTRAVENOUS at 11:56

## 2019-09-11 RX ADMIN — SENNOSIDES, DOCUSATE SODIUM 2 TABLET: 50; 8.6 TABLET, FILM COATED ORAL at 04:29

## 2019-09-11 RX ADMIN — BENZOCAINE AND MENTHOL 1 LOZENGE: 15; 3.6 LOZENGE ORAL at 17:55

## 2019-09-11 RX ADMIN — FAMOTIDINE 20 MG: 20 TABLET ORAL at 04:29

## 2019-09-11 RX ADMIN — TIOTROPIUM BROMIDE 1 CAPSULE: 18 CAPSULE ORAL; RESPIRATORY (INHALATION) at 08:39

## 2019-09-11 RX ADMIN — NICOTINE TRANSDERMAL SYSTEM 21 MG: 21 PATCH, EXTENDED RELEASE TRANSDERMAL at 04:29

## 2019-09-11 RX ADMIN — DOXYCYCLINE 100 MG: 100 TABLET, FILM COATED ORAL at 17:55

## 2019-09-11 RX ADMIN — AMPICILLIN AND SULBACTAM 3 G: 2; 1 INJECTION, POWDER, FOR SOLUTION INTRAVENOUS at 00:05

## 2019-09-11 RX ADMIN — DOXYCYCLINE 100 MG: 100 TABLET, FILM COATED ORAL at 08:39

## 2019-09-11 RX ADMIN — AMPICILLIN AND SULBACTAM 3 G: 2; 1 INJECTION, POWDER, FOR SOLUTION INTRAVENOUS at 05:56

## 2019-09-11 RX ADMIN — FAMOTIDINE 20 MG: 20 TABLET ORAL at 17:55

## 2019-09-11 RX ADMIN — SENNOSIDES, DOCUSATE SODIUM 2 TABLET: 50; 8.6 TABLET, FILM COATED ORAL at 17:55

## 2019-09-11 RX ADMIN — BENZOCAINE AND MENTHOL 1 LOZENGE: 15; 3.6 LOZENGE ORAL at 10:30

## 2019-09-11 ASSESSMENT — ENCOUNTER SYMPTOMS
CONSTITUTIONAL NEGATIVE: 1
MYALGIAS: 1
NEUROLOGICAL NEGATIVE: 1
RESPIRATORY NEGATIVE: 1
GASTROINTESTINAL NEGATIVE: 1
SORE THROAT: 1
PSYCHIATRIC NEGATIVE: 1

## 2019-09-11 NOTE — DISCHARGE PLANNING
Anticipated Discharge Disposition: Home    Action: MD informed SW that pt has a cat at home that does not have food available. LSW met with pt at bedside to discuss. Pt stated he has a nephew in [place] that can go feed the cat but he does not have his contact information. LSW attempted to call pt's sister on emergency contacts but it was a wrong number. Pt requested LSW to contact  to get sister's number. LSW LM with [facility] ([phone]).     LSW completed assessment with pt. Pt confirmed facesheet information. Pt lives alone. No AD on file, packet provided. Pharmacy is [facility]. Pt has hx of alcohol abuse a couple years ago but stated he now only drinks a couple times a week.    Barriers to Discharge: None    Plan: LSW continue to contact apartments for contact information.      Care Transition Team Assessment    Information Source  Orientation : Oriented x 4  Information Given By: Patient  Who is responsible for making decisions for patient? : Patient         Elopement Risk  Legal Hold: No  Ambulatory or Self Mobile in Wheelchair: Yes  Disoriented: No  Psychiatric Symptoms: None  History of Wandering: No  Elopement this Admit: No  Vocalizing Wanting to Leave: No  Displays Behaviors, Body Language Wanting to Leave: No-Not at Risk for Elopement  Elopement Risk: Not at Risk for Elopement    Interdisciplinary Discharge Planning  Patient or legal guardian wants to designate a caregiver (see row info): No    Discharge Preparedness  What is your plan after discharge?: Home with help  What are your discharge supports?: (None)  Prior Functional Level: Ambulatory, Independent with Activities of Daily Living, Independent with Medication Management    Functional Assesment  Prior Functional Level: Ambulatory, Independent with Activities of Daily Living, Independent with Medication Management    Finances  Financial Barriers to Discharge: No  Prescription Coverage:  Yes    Vision / Hearing Impairment  Vision Impairment : No  Hearing Impairment : No         Advance Directive  Advance Directive?: None  Advance Directive offered?: AD Booklet given    Domestic Abuse  Have you ever been the victim of abuse or violence?: No  Physical Abuse or Sexual Abuse: No  Verbal Abuse or Emotional Abuse: No  Possible Abuse Reported to:: Not Applicable    Psychological Assessment  History of Substance Abuse: Alcohol  History of Psychiatric Problems: No  Non-compliant with Treatment: No  Newly Diagnosed Illness: No    Discharge Risks or Barriers  Discharge risks or barriers?: Lives alone, no community support  Patient risk factors: Lack of outside supports, Lives alone and no community support    Anticipated Discharge Information  Anticipated discharge disposition: Home

## 2019-09-11 NOTE — PROGRESS NOTES
[facility] Medicine Daily Progress Note    Date of Service  9/11/2019    Chief Complaint  58 y.o. male admitted 9/9/2019 with right leg swelling      Interval Problem Update  Right leg swelling and redness is not improving as quickly as patient would like    Patient is on IV Unasyn    Afebrile    Patient was told to elevate his right leg.. He has not since yesterday. He was shown how to elevate his legs    Doppler of the right leg shows NO evidence of DVT    Pt c/o sore throat    Consultants/Specialty  none    Code Status  full    Disposition  home    Review of Systems  Review of Systems   Constitutional: Negative.    HENT: Positive for sore throat.    Respiratory: Negative.    Cardiovascular: Positive for leg swelling.   Gastrointestinal: Negative.    Genitourinary: Negative.    Musculoskeletal: Positive for myalgias.   Neurological: Negative.    Psychiatric/Behavioral: Negative.         Physical Exam  Temp:  [36.5 °C (97.7 °F)-37.5 °C (99.5 °F)] 36.6 °C (97.8 °F)  Pulse:  [65-72] 67  Resp:  [16-18] 18  BP: (111-129)/(70-79) 111/70  SpO2:  [94 %-95 %] 94 %    Physical Exam   Constitutional: He is oriented to person, place, and time. He appears well-nourished. No distress.   HENT:   Head: Normocephalic and atraumatic.   Eyes: Left eye exhibits no discharge. No scleral icterus.   Neck: Neck supple. No JVD present. No tracheal deviation present. No thyromegaly present.   Cardiovascular: Normal rate, regular rhythm and intact distal pulses. Exam reveals no friction rub.   No murmur heard.  Pulmonary/Chest: Effort normal and breath sounds normal. No respiratory distress. He has no wheezes. He has no rales.   Abdominal: Soft. He exhibits distension. There is no tenderness. There is no rebound and no guarding.   Musculoskeletal: Normal range of motion. He exhibits edema.   Neurological: He is alert and oriented to person, place, and time. No cranial nerve deficit. Coordination normal.   Skin: No rash noted. He is not  diaphoretic. There is erythema (diffuse right lower leg). No pallor.   Psychiatric: He has a normal mood and affect.       Fluids    Intake/Output Summary (Last 24 hours) at 9/11/2019 1319  Last data filed at 9/11/2019 0106  Gross per 24 hour   Intake 480 ml   Output 200 ml   Net 280 ml       Laboratory  Recent Labs     09/09/19  1840 09/10/19  0501 09/11/19  0101   WBC 15.5* 13.8* 12.1*   RBC 4.74 4.33* 4.36*   HEMOGLOBIN 14.4 13.0* 13.1*   HEMATOCRIT 45.6 41.9* 41.8*   MCV 96.2 96.8 95.9   MCH 30.4 30.0 30.0   MCHC 31.6* 31.0* 31.3*   RDW 46.9 46.8 45.6   PLATELETCT 489* 455* 464*   MPV 9.7 9.4 9.2     Recent Labs     09/09/19  1840 09/10/19  0501 09/11/19  0101   SODIUM 138 138 135   POTASSIUM 4.6 4.1 3.9   CHLORIDE 103 105 103   CO2 26 25 24   GLUCOSE 97 102* 141*   BUN 14 11 10   CREATININE 1.00 0.85 1.04   CALCIUM 9.4 8.7 8.9     Recent Labs     09/09/19  1840   INR 1.01               Imaging  US-EXTREMITY VENOUS LOWER UNILAT RIGHT   Final Result      DX-CHEST-PORTABLE (1 VIEW)   Final Result      No acute cardiopulmonary abnormality.      CT-SOFT TISSUE NECK WITH   Final Result      1.  Soft tissue swelling and pharyngeal narrowing is noted which could indicate tonsillitis or pharyngitis.      2.  A few mildly enlarged lymph nodes are also identified in the region. Enlargement could be due to inflammation or infection or less likely neoplasm.           Assessment/Plan  * Cellulitis- (present on admission)  Assessment & Plan  -Significant cellulitis involving the entire right leg  -He does have multiple scratches associated with the cellulitis but no true open wound  -Causing sepsis   IV Unasyn    Added po doxycycline on 9/11    Sepsis (HCC)- (present on admission)  Assessment & Plan  -This is sepsis (without associated acute organ dysfunction).   -Due to right leg cellulitis   IV Unasyn  -Patient also with pharyngitis, no sign of abscess, will be covered with Unasyn      Pharyngitis- (present on  admission)  Assessment & Plan    -Patient will be on Unasyn for his cellulitis which will cover his pharyngitis    Lozenge prn    Tobacco abuse- (present on admission)  Assessment & Plan  -Tobacco cessation advised    Thrombocytosis (HCC)- (present on admission)  Assessment & Plan  -Likely due to dehydration and hydration  Ok to heplock iv on 9/10  -Repeat CBC in the morning    GERD (gastroesophageal reflux disease)- (present on admission)  Assessment & Plan  -Continue home H2 blocker       VTE prophylaxis: lovenox    Check am cbc, bmp

## 2019-09-11 NOTE — DISCHARGE PLANNING
Patient is eligible for Medicaid Meds to Beds at discharge Monday-Friday 8 a.m. - 4 p.m. Preferred pharmacy changed to [facility]. Please call [phone] prior to discharge.    Danni Dennis, NathaliaD

## 2019-09-11 NOTE — CARE PLAN
Problem: Safety  Goal: Will remain free from injury  Outcome: PROGRESSING AS EXPECTED   Safety precautions in use including use of call bell for assistance, bed in lowest position, bed/chair alarm not utilized and use of treaded socks. Objects in room moved to allow access to bathroom   Problem: Infection  Goal: Will remain free from infection  Outcome: PROGRESSING AS EXPECTED   Administer antibiotics as ordered.

## 2019-09-11 NOTE — PROGRESS NOTES
"Received bedside report from Night RN. Pt awake and alert. Bed alarm not in use. Complained of sore throat. Will discuss with Hospitalist. Discussed plan of care. /70   Pulse 67   Temp 36.6 °C (97.8 °F) (Temporal)   Resp 18   Ht 1.727 m (5' 8\")   Wt 98.7 kg (217 lb 9.5 oz)   SpO2 94%   BMI 33.09 kg/m²     "

## 2019-09-11 NOTE — PROGRESS NOTES
Report received on this patient. He is alert an oriented times 4. PIV in place. No known needs at this time.

## 2019-09-12 ENCOUNTER — PATIENT OUTREACH (OUTPATIENT)
Dept: HEALTH INFORMATION MANAGEMENT | Facility: OTHER | Age: 59
End: 2019-09-12

## 2019-09-12 VITALS
WEIGHT: 217.59 LBS | SYSTOLIC BLOOD PRESSURE: 124 MMHG | OXYGEN SATURATION: 95 % | RESPIRATION RATE: 17 BRPM | BODY MASS INDEX: 32.98 KG/M2 | HEIGHT: 68 IN | DIASTOLIC BLOOD PRESSURE: 85 MMHG | TEMPERATURE: 97.6 F | HEART RATE: 65 BPM

## 2019-09-12 PROBLEM — D75.839 THROMBOCYTOSIS: Status: RESOLVED | Noted: 2019-09-10 | Resolved: 2019-09-12

## 2019-09-12 PROBLEM — A41.9 SEPSIS (HCC): Status: RESOLVED | Noted: 2019-09-10 | Resolved: 2019-09-12

## 2019-09-12 PROBLEM — L03.90 CELLULITIS: Status: RESOLVED | Noted: 2019-09-10 | Resolved: 2019-09-12

## 2019-09-12 PROBLEM — J02.9 PHARYNGITIS: Status: RESOLVED | Noted: 2019-09-10 | Resolved: 2019-09-12

## 2019-09-12 PROCEDURE — 700102 HCHG RX REV CODE 250 W/ 637 OVERRIDE(OP): Performed by: HOSPITALIST

## 2019-09-12 PROCEDURE — 99239 HOSP IP/OBS DSCHRG MGMT >30: CPT | Performed by: HOSPITALIST

## 2019-09-12 PROCEDURE — A9270 NON-COVERED ITEM OR SERVICE: HCPCS | Performed by: HOSPITALIST

## 2019-09-12 PROCEDURE — A9270 NON-COVERED ITEM OR SERVICE: HCPCS | Performed by: INTERNAL MEDICINE

## 2019-09-12 PROCEDURE — 700102 HCHG RX REV CODE 250 W/ 637 OVERRIDE(OP): Performed by: INTERNAL MEDICINE

## 2019-09-12 PROCEDURE — 700105 HCHG RX REV CODE 258: Performed by: INTERNAL MEDICINE

## 2019-09-12 PROCEDURE — 700111 HCHG RX REV CODE 636 W/ 250 OVERRIDE (IP): Performed by: INTERNAL MEDICINE

## 2019-09-12 RX ORDER — DOXYCYCLINE 100 MG/1
100 TABLET ORAL EVERY 12 HOURS
Qty: 14 TAB | Refills: 0 | Status: SHIPPED | OUTPATIENT
Start: 2019-09-12 | End: 2019-12-02

## 2019-09-12 RX ORDER — TIOTROPIUM BROMIDE INHALATION SPRAY 3.12 UG/1
2.5 SPRAY, METERED RESPIRATORY (INHALATION) DAILY
Qty: 1 INHALER | Refills: 2 | Status: SHIPPED | OUTPATIENT
Start: 2019-09-12 | End: 2019-12-02

## 2019-09-12 RX ORDER — AMOXICILLIN AND CLAVULANATE POTASSIUM 875; 125 MG/1; MG/1
1 TABLET, FILM COATED ORAL 2 TIMES DAILY
Qty: 14 TAB | Refills: 0 | Status: SHIPPED | OUTPATIENT
Start: 2019-09-12 | End: 2019-12-02

## 2019-09-12 RX ORDER — ALBUTEROL SULFATE 90 UG/1
1-2 AEROSOL, METERED RESPIRATORY (INHALATION) EVERY 4 HOURS PRN
Qty: 8.5 G | Refills: 1 | Status: SHIPPED | OUTPATIENT
Start: 2019-09-12 | End: 2019-12-02 | Stop reason: SDUPTHER

## 2019-09-12 RX ORDER — FAMOTIDINE 20 MG/1
20 TABLET, FILM COATED ORAL 2 TIMES DAILY
Qty: 60 TAB | Refills: 3 | Status: SHIPPED
Start: 2019-09-12 | End: 2019-12-21 | Stop reason: CLARIF

## 2019-09-12 RX ADMIN — NICOTINE TRANSDERMAL SYSTEM 21 MG: 21 PATCH, EXTENDED RELEASE TRANSDERMAL at 05:58

## 2019-09-12 RX ADMIN — FAMOTIDINE 20 MG: 20 TABLET ORAL at 05:59

## 2019-09-12 RX ADMIN — BENZOCAINE AND MENTHOL 1 LOZENGE: 15; 3.6 LOZENGE ORAL at 01:26

## 2019-09-12 RX ADMIN — BENZOCAINE AND MENTHOL 1 LOZENGE: 15; 3.6 LOZENGE ORAL at 06:04

## 2019-09-12 RX ADMIN — AMPICILLIN AND SULBACTAM 3 G: 2; 1 INJECTION, POWDER, FOR SOLUTION INTRAVENOUS at 12:20

## 2019-09-12 RX ADMIN — TIOTROPIUM BROMIDE 1 CAPSULE: 18 CAPSULE ORAL; RESPIRATORY (INHALATION) at 12:20

## 2019-09-12 RX ADMIN — AMPICILLIN AND SULBACTAM 3 G: 2; 1 INJECTION, POWDER, FOR SOLUTION INTRAVENOUS at 05:56

## 2019-09-12 RX ADMIN — DOXYCYCLINE 100 MG: 100 TABLET, FILM COATED ORAL at 05:59

## 2019-09-12 NOTE — DISCHARGE PLANNING
Medication reconcilliation completed. Medications delivered to patient at bedside. Patient counseled.    Interventions include: patient states he uses his albuterol inhaler daily. Recommend continuing Spiriva. Physician agrees. Patient states Ventolin works better than albuterol inhaler (generic), however insurance does not cover brand name inhaler. He also states he is having a hard time making an appointment with PCP, Stephany Galvan, d/t being 5 min late to prior appt. Notified CHW to assist with post d/c f/u appointment.     Frederic You   Home Medication Instructions JALYN:[id number]    Printed on:09/12/19 113   Medication Information                      albuterol 108 (90 Base) MCG/ACT Aero Soln inhalation aerosol  Inhale 1-2 Puffs by mouth every four hours as needed.             amoxicillin-clavulanate (AUGMENTIN) 875-125 MG Tab  Take 1 Tab by mouth 2 times a day.             doxycycline monohydrate (ADOXA) 100 MG tablet  Take 1 Tab by mouth every 12 hours.             famotidine (PEPCID) 20 MG Tab  Take 1 Tab by mouth 2 Times a Day.             SPIRIVA RESPIMAT 2.5 MCG/ACT Aero Soln  Inhale 2.5 mcg by mouth every day.

## 2019-09-12 NOTE — PROGRESS NOTES
Received bedside report from NOC RN. Assumed care at 0700. Patient resting comfortably in bed, no s/s of distress at this time. Patient able to make needs known and denies any at this time. Bed alarm on, fall precautions implemented.

## 2019-09-12 NOTE — DISCHARGE INSTRUCTIONS
Discharge Instructions    Discharged to home by taxi with self. Discharged via walking, hospital escort: Refused.  Special equipment needed: Not Applicable    Be sure to schedule a follow-up appointment with your primary care doctor or any specialists as instructed.     Discharge Plan:   Smoking Cessation Offered: Patient Refused  Influenza Vaccine Indication: Patient Refuses    I understand that a diet low in cholesterol, fat, and sodium is recommended for good health. Unless I have been given specific instructions below for another diet, I accept this instruction as my diet prescription.   Other diet: Regular      Special Instructions: None    · Is patient discharged on Warfarin / Coumadin?   No     Depression / Suicide Risk    As you are discharged from this [facility] facility, it is important to learn how to keep safe from harming yourself.    Recognize the warning signs:  · Abrupt changes in personality, positive or negative- including increase in energy   · Giving away possessions  · Change in eating patterns- significant weight changes-  positive or negative  · Change in sleeping patterns- unable to sleep or sleeping all the time   · Unwillingness or inability to communicate  · Depression  · Unusual sadness, discouragement and loneliness  · Talk of wanting to die  · Neglect of personal appearance   · Rebelliousness- reckless behavior  · Withdrawal from people/activities they love  · Confusion- inability to concentrate     If you or a loved one observes any of these behaviors or has concerns about self-harm, here's what you can do:  · Talk about it- your feelings and reasons for harming yourself  · Remove any means that you might use to hurt yourself (examples: pills, rope, extension cords, firearm)  · Get professional help from the community (Mental Health, Substance Abuse, psychological counseling)  · Do not be alone:Call your Safe Contact- someone whom you trust who will be there for you.  · Call your  local CRISIS HOTLINE 528-5329 or 347-793-5165  · Call your local Children's Mobile Crisis Response Team [place] (720) 505-0901 or www.[employer]  · Call the toll free National Suicide Prevention Hotlines   · National Suicide Prevention Lifeline 799-568-SCPJ (0811)  · National Hope Line Network 800-SUICIDE (451-2998)      F/u pcp asap    Do not smoke

## 2019-09-12 NOTE — CARE PLAN
Problem: Safety  Goal: Will remain free from injury  Outcome: PROGRESSING AS EXPECTED  Goal: Will remain free from falls  Outcome: PROGRESSING AS EXPECTED   Fall precautions in place, bed in lowest pos, frequent rounding initiated

## 2019-09-13 ENCOUNTER — PATIENT OUTREACH (OUTPATIENT)
Dept: HEALTH INFORMATION MANAGEMENT | Facility: OTHER | Age: 59
End: 2019-09-13

## 2019-09-13 SDOH — ECONOMIC STABILITY: TRANSPORTATION INSECURITY
IN THE PAST 12 MONTHS, HAS LACK OF TRANSPORTATION KEPT YOU FROM MEETINGS, WORK, OR FROM GETTING THINGS NEEDED FOR DAILY LIVING?: NO

## 2019-09-13 SDOH — ECONOMIC STABILITY: TRANSPORTATION INSECURITY
IN THE PAST 12 MONTHS, HAS THE LACK OF TRANSPORTATION KEPT YOU FROM MEDICAL APPOINTMENTS OR FROM GETTING MEDICATIONS?: NO

## 2019-09-13 SDOH — ECONOMIC STABILITY: FOOD INSECURITY: WITHIN THE PAST 12 MONTHS, THE FOOD YOU BOUGHT JUST DIDN'T LAST AND YOU DIDN'T HAVE MONEY TO GET MORE.: NEVER TRUE

## 2019-09-13 SDOH — ECONOMIC STABILITY: FOOD INSECURITY: WITHIN THE PAST 12 MONTHS, YOU WORRIED THAT YOUR FOOD WOULD RUN OUT BEFORE YOU GOT MONEY TO BUY MORE.: NEVER TRUE

## 2019-09-13 SDOH — ECONOMIC STABILITY: INCOME INSECURITY: HOW HARD IS IT FOR YOU TO PAY FOR THE VERY BASICS LIKE FOOD, HOUSING, MEDICAL CARE, AND HEATING?: NOT VERY HARD

## 2019-09-13 NOTE — DISCHARGE SUMMARY
Discharge Summary    CHIEF COMPLAINT ON ADMISSION  Chief Complaint   Patient presents with   • Leg Swelling     right, +redness x >1wk   • Sore Throat       Reason for Admission  Leg Pain     Admission Date  9/9/2019    CODE STATUS  Prior    HPI & HOSPITAL COURSE  This is a 58 y.o. male here with right leg swelling and redness and sore throat    He was treated with IV antibiotics IV Unasyn for cellulitis and pharyngitis    He received lozenges as needed    He was told to elevate his right lower extrema    He progress was noted but slow be added doxycycline stable for discharge on 912    Doppler of the right lower extremity so no evidence of DVT       Therefore, he is discharged in good and stable condition to home with close outpatient follow-up.    The patient met 2-midnight criteria for an inpatient stay at the time of discharge.    Discharge Date  9/12/2019    FOLLOW UP ITEMS POST DISCHARGE      DISCHARGE DIAGNOSES  Principal Problem (Resolved):    Cellulitis POA: Yes  Active Problems:    GERD (gastroesophageal reflux disease) POA: Yes    Tobacco abuse POA: Yes  Resolved Problems:    Sepsis (HCC) POA: Yes    Thrombocytosis (HCC) POA: Yes    Pharyngitis POA: Yes      FOLLOW UP  No future appointments.  DYLON Earl  [address] [place] [id number]  [phone]      Per Office please call to schedule your hospital follow up. Thank you       MEDICATIONS ON DISCHARGE     Medication List      START taking these medications      Instructions   amoxicillin-clavulanate 875-125 MG Tabs  Commonly known as:  AUGMENTIN   Take 1 Tab by mouth 2 times a day.  Dose:  1 Tab     doxycycline monohydrate 100 MG tablet  Commonly known as:  ADOXA   Take 1 Tab by mouth every 12 hours.  Dose:  100 mg     famotidine 20 MG Tabs  Commonly known as:  PEPCID   Take 1 Tab by mouth 2 Times a Day.  Dose:  20 mg        CONTINUE taking these medications      Instructions   albuterol 108 (90 Base) MCG/ACT Aers  inhalation aerosol   Inhale 1-2 Puffs by mouth every four hours as needed.  Dose:  1-2 Puff     fenofibrate 145 MG Tabs  Commonly known as:  TRICOR   Take 145 mg by mouth every day.  Dose:  145 mg     SPIRIVA RESPIMAT 2.5 MCG/ACT Aers  Generic drug:  Tiotropium Bromide Monohydrate   Inhale 2.5 mcg by mouth every day.  Dose:  2.5 mcg        STOP taking these medications    naproxen 500 MG Tabs  Commonly known as:  NAPROSYN     raNITidine 150 MG Tabs  Commonly known as:  ZANTAC            Allergies  No Known Allergies    DIET  No orders of the defined types were placed in this encounter.      ACTIVITY  As tolerated.  Weight bearing as tolerated    CONSULTATIONS      PROCEDURES  [facility] - NON-INVASIVE LAB - [address] [address] Frederic You  MRN: [id number], : 1960, Sex: M  Visit date: 2019   Patient Information     Patient Name  Frederic You ([id number]) Sex  Male   1960   Code Status   Prior   Images      Show images for US-EXTREMITY VENOUS LOWER UNILAT RIGHT   US-EXTREMITY VENOUS LOWER UNILAT RIGHT [[id number]]     Electronically signed by: Alli Christian M.D. on 19 Status: Completed   Ordering user: Alli Christian M.D. 19 Ordering provider: Alli Christian M.D.   Authorized by: Alli Christian M.D. Ordered during: ED to Hosp-Admission (Discharged) on 2019    Add Signature Requirement   Frequency: Once 19 -   occurrence   Questionnaire     Question Answer   Left/Right Indicator: RIGHT   Reason for Exam: Swelling of Limb   Screening Form     General Information     Patient Name: Frederic You   YOB: 1960   Legal Sex: Male    MRN: [id number]   Home Phone: [phone]          Procedure Ordering Provider Authorizing Provider Appointment Information   US-EXTREMITY VENOUS LOWER UNILAT Alli Christian M.D.    [phone]    Alli Christian M.D.    [phone]     2019  7:10 PM  [facility] [place] [facility] VASCULAR PORTABLE  NON-INVASIVE LAB [facility] [place] [facility]   Screening Form Questions     No questions have been answered for this form.   Reprint Order Requisition     US-EXTREMITY VENOUS LOWER UNILAT RIGHT (Order #[id number]) on 19   Linked Documents      View [employer] Report   Last Resulted Time   Mon Sep 9, 2019  8:36 PM   Imaging Result Status     Status: Final result (Exam End: 2019  8:07 PM)   Imaging Previous Results     Open Hard Copy Result Report (Order #[id number] - US-EXTREMITY VENOUS LOWER UNILAT RIGHT)   Narrative                Vascular Laboratory   CONCLUSIONS   No evidence of deep vein thrombosis.     CAMI PETTIT     Exam Date:     2019 19:56     Room #:     Inpatient     Priority:     Stat     Ht (in):             Wt (lb):     Ordering Physician:        GWENDOLYN MCMAHAN     Referring Physician:       [id number] Flores     Sonographer:               Jacob Beard RDMS     Study Type:                Complete Unilateral     Technical Quality:         Adequate     Age:    58    Gender:     M     MRN:    [id number]     :    1960      BSA:     Indications:     Localized swelling, mass and lump, right lower limb     CPT Codes:       57126     ICD Codes:       R22.41     History:         No Previous LEV, Right lower extremity swelling/edema     Limitations:     PROCEDURES:   Right lower extremity venous duplex imaging.    The following venous structures were evaluated: common femoral vein,    profunda vein, proximal portion of the greater saphenous vein, superficial    femoral vein, and the popliteal vein.    In addition, the posterior tibial and peroneal veins were evaluated.      Serial compression, augmentation maneuvers, and spectral Doppler flow    evaluation were performed.      FINDINGS:   Right lower extremity -.    The veins of the right lower extremity are readily compressible with normal    venous flow dynamics including spontaneous flow, respiratory phasic     variation and augmentation.      There is subcutaneous right lower extremity edema noted.      The peroneal and posterior tibial veins are difficult to assess for    compressibility, but flow response to augmentation is demonstrated.      Contralateral flow was obtained in the left common femoral vein and appears    to be normal.      No evidence of deep vein thrombosis or superficial phlebitis in the right    lower extremity.            Hardik Peña   (Electronically Signed)   Final Date:      09 September 2019                     20:36         LABORATORY  Lab Results   Component Value Date    SODIUM 135 09/11/2019    POTASSIUM 3.9 09/11/2019    CHLORIDE 103 09/11/2019    CO2 24 09/11/2019    GLUCOSE 141 (H) 09/11/2019    BUN 10 09/11/2019    CREATININE 1.04 09/11/2019        Lab Results   Component Value Date    WBC 12.1 (H) 09/11/2019    HEMOGLOBIN 13.1 (L) 09/11/2019    HEMATOCRIT 41.8 (L) 09/11/2019    PLATELETCT 464 (H) 09/11/2019        Total time of the discharge process exceeds 43 minutes.

## 2019-09-13 NOTE — PROGRESS NOTES
Community Health Worker Intake  • Social determinates of health intake complete.   • Identified barriers to meeting with PCP.  • Contact information provided to Frederic You.      Plan:   CHW Sourav followed up with pt on 9/19/2019. Patient established a PCP at the [facility]. Patient has upcoming appointment on 12/2/19 with Dionne Garcia. Met all goals. CCM will d/c patient from all CCM services.

## 2019-09-14 LAB
BACTERIA BLD CULT: NORMAL
BACTERIA BLD CULT: NORMAL
SIGNIFICANT IND 70042: NORMAL
SIGNIFICANT IND 70042: NORMAL
SITE SITE: NORMAL
SITE SITE: NORMAL
SOURCE SOURCE: NORMAL
SOURCE SOURCE: NORMAL

## 2019-09-18 ENCOUNTER — TELEPHONE (OUTPATIENT)
Dept: SCHEDULING | Facility: IMAGING CENTER | Age: 59
End: 2019-09-18

## 2019-12-02 ENCOUNTER — OFFICE VISIT (OUTPATIENT)
Dept: MEDICAL GROUP | Facility: MEDICAL CENTER | Age: 59
End: 2019-12-02
Attending: NURSE PRACTITIONER
Payer: MEDICAID

## 2019-12-02 VITALS
TEMPERATURE: 97.2 F | HEART RATE: 83 BPM | HEIGHT: 68 IN | BODY MASS INDEX: 34.1 KG/M2 | WEIGHT: 225 LBS | DIASTOLIC BLOOD PRESSURE: 80 MMHG | OXYGEN SATURATION: 93 % | SYSTOLIC BLOOD PRESSURE: 140 MMHG

## 2019-12-02 DIAGNOSIS — Z13.6 SCREENING FOR CARDIOVASCULAR CONDITION: ICD-10-CM

## 2019-12-02 DIAGNOSIS — R06.02 SHORTNESS OF BREATH: ICD-10-CM

## 2019-12-02 DIAGNOSIS — Z13.21 ENCOUNTER FOR VITAMIN DEFICIENCY SCREENING: ICD-10-CM

## 2019-12-02 DIAGNOSIS — E66.9 OBESITY (BMI 30-39.9): ICD-10-CM

## 2019-12-02 DIAGNOSIS — Z76.89 ENCOUNTER TO ESTABLISH CARE: ICD-10-CM

## 2019-12-02 DIAGNOSIS — Z13.228 SCREENING FOR METABOLIC DISORDER: ICD-10-CM

## 2019-12-02 DIAGNOSIS — M79.89 RIGHT LEG SWELLING: ICD-10-CM

## 2019-12-02 DIAGNOSIS — Z11.59 NEED FOR HEPATITIS C SCREENING TEST: ICD-10-CM

## 2019-12-02 DIAGNOSIS — K21.9 GASTROESOPHAGEAL REFLUX DISEASE, ESOPHAGITIS PRESENCE NOT SPECIFIED: ICD-10-CM

## 2019-12-02 DIAGNOSIS — E78.1 HYPERTRIGLYCERIDEMIA: ICD-10-CM

## 2019-12-02 PROCEDURE — 99214 OFFICE O/P EST MOD 30 MIN: CPT | Performed by: NURSE PRACTITIONER

## 2019-12-02 PROCEDURE — 99203 OFFICE O/P NEW LOW 30 MIN: CPT | Performed by: NURSE PRACTITIONER

## 2019-12-02 PROCEDURE — 99213 OFFICE O/P EST LOW 20 MIN: CPT | Performed by: NURSE PRACTITIONER

## 2019-12-02 RX ORDER — ALBUTEROL SULFATE 90 UG/1
1-2 AEROSOL, METERED RESPIRATORY (INHALATION) EVERY 4 HOURS PRN
Qty: 8.5 G | Refills: 11 | Status: SHIPPED | OUTPATIENT
Start: 2019-12-02 | End: 2020-05-14 | Stop reason: SDUPTHER

## 2019-12-02 RX ORDER — FENOFIBRATE 145 MG/1
145 TABLET, COATED ORAL DAILY
Qty: 30 TAB | Refills: 0 | Status: SHIPPED | OUTPATIENT
Start: 2019-12-02 | End: 2020-01-20

## 2019-12-02 RX ORDER — RANITIDINE 150 MG/1
TABLET ORAL
Refills: 0 | COMMUNITY
Start: 2019-10-10 | End: 2019-12-02

## 2019-12-02 ASSESSMENT — ENCOUNTER SYMPTOMS
PALPITATIONS: 0
WEIGHT LOSS: 0
DIARRHEA: 0
CHILLS: 0
FEVER: 0
SHORTNESS OF BREATH: 0
CONSTIPATION: 0
ABDOMINAL PAIN: 0
COUGH: 0
WHEEZING: 0
BLOOD IN STOOL: 0

## 2019-12-02 ASSESSMENT — PATIENT HEALTH QUESTIONNAIRE - PHQ9: CLINICAL INTERPRETATION OF PHQ2 SCORE: 0

## 2019-12-02 NOTE — ASSESSMENT & PLAN NOTE
Was treated for cellulitis inpatient in 9/19.  His redness and pain have resolved, but not his edema.  Denies fever.  He had elevated his RLE for about a week after d/c.  He completed his abx.

## 2019-12-02 NOTE — PROGRESS NOTES
"Chief Complaint   Patient presents with   • Establish Care     Establish care. Edema in Rt lower leg.       Subjective:     HPI:   Frederic You is a 59 y.o. male here to establish care, cholesterol concerns,  and to discuss the evaluation and management of:      Encounter to establish care  Prior PCP: Stephany Galvan at [facility]    Other Providers:  none    Hypertriglyceridemia  Dx 3 yrs ago, reports only his tri was high.  He reports his tri was >1500 on dx.  He takes tricor.  Denies side effects.  He and his previous provider had tried to decrease the med and implement dietary changes about a year ago.  About 9 months ago he stopped the medication and rechecked his labs, tri was elevated again, so tricor was restarted and he has been taking consistently for about 6 months.     GERD (gastroesophageal reflux disease)  Present for many years.  Per pt he used to be a \"heavy drinker\", he was drinking a 1/5 bottle of liquor daily.  [facility] and was sober for a year.  He now only drinks several days a week (has been doing this for 3 years).  He takes pepcid, it is helpful. He has never been evaluated.     Right leg swelling  Was treated for cellulitis inpatient in 9/19.  His redness and pain have resolved, but not his edema.  Denies fever.  He had elevated his RLE for about a week after d/c.  He completed his abx.     Shortness of breath  Was told in the past that he was in the early stages of COPD.  He has a rescue inhaler that he uses approx 4 times a week.  He was on spirivia respimat and did not find it helpful.  He reports his SOB is worse with smoking.  The inhaler improves his SOB.       ROS  Review of Systems   Constitutional: Negative for chills, fever, malaise/fatigue and weight loss.   Respiratory: Negative for cough, shortness of breath and wheezing.    Cardiovascular: Negative for chest pain, palpitations and leg swelling.   Gastrointestinal: Negative for abdominal pain, blood in " "stool, constipation and diarrhea.         No Known Allergies    Current medicines (including changes today)  Current Outpatient Medications   Medication Sig Dispense Refill   • fenofibrate (TRICOR) 145 MG Tab Take 1 Tab by mouth every day. 30 Tab 0   • albuterol 108 (90 Base) MCG/ACT Aero Soln inhalation aerosol Inhale 1-2 Puffs by mouth every four hours as needed. 8.5 g 11   • famotidine (PEPCID) 20 MG Tab Take 1 Tab by mouth 2 Times a Day. 60 Tab 3     No current facility-administered medications for this visit.      He  has a past medical history of COPD (chronic obstructive pulmonary disease) ([facility]), Seizure ([facility]) (2/2013), and Skull fractures ([facility]).  He  has a past surgical history that includes inguinal hernia repair (Left, 6/25/2015) and laceration repair (1979).  Social History     Tobacco Use   • Smoking status: Current Every Day Smoker     Packs/day: 1.00     Years: 38.00     Pack years: 38.00     Types: Cigarettes   • Smokeless tobacco: Never Used   Substance Use Topics   • Alcohol use: Yes     Frequency: 2-3 times a week     Drinks per session: 1 or 2     Binge frequency: Monthly     Comment: one pint per day   • Drug use: No       Family History   Problem Relation Age of Onset   • Diabetes Mother    • Hypertension Mother    • Diabetes Father      Family Status   Relation Name Status   • Mo  (Not Specified)       Patient Active Problem List    Diagnosis Date Noted   • Encounter to establish care 12/02/2019   • Obesity (BMI 30-39.9) 12/02/2019   • Hypertriglyceridemia 12/02/2019   • Right leg swelling 12/02/2019   • Shortness of breath 12/02/2019   • GERD (gastroesophageal reflux disease) 09/10/2019   • Tobacco abuse 09/10/2019          Objective:     /80   Pulse 83   Temp 36.2 °C (97.2 °F)   Ht 1.727 m (5' 8\")   Wt 102.1 kg (225 lb)   SpO2 93%  Body mass index is 34.21 kg/m².    Physical Exam:  Physical Exam   Constitutional: He is oriented to person, place, and time and well-developed, " well-nourished, and in no distress. No distress.   HENT:   Head: Normocephalic.   Right Ear: Tympanic membrane and external ear normal.   Left Ear: Tympanic membrane and external ear normal.   Eyes: Pupils are equal, round, and reactive to light. Conjunctivae and EOM are normal.   Neck: Normal range of motion. Neck supple. No tracheal deviation present.   Cardiovascular: Normal rate, regular rhythm, normal heart sounds and intact distal pulses.   Pulmonary/Chest: Effort normal and breath sounds normal.   Abdominal: Soft. Bowel sounds are normal.   Musculoskeletal: Normal range of motion.   Lymphadenopathy:        Head (right side): No preauricular adenopathy present.        Head (left side): No preauricular adenopathy present.     He has no cervical adenopathy.   Neurological: He is alert and oriented to person, place, and time. He has normal sensation, normal strength and intact cranial nerves. Gait normal.   Skin: Skin is warm and dry.   Psychiatric: Affect and judgment normal.          Assessment and Plan:     The following treatment plan was discussed:    1. Right leg swelling  US-EXTREMITY ARTERY UPPER UNILAT W/WBI (COMBO)  -Chronic problem, unstable.  No signs of infection on the limb as there is no redness or warmth.  Patient denies fever and is afebrile in office.  There is still 1+, nonpitting edema.  Patient denies pain in the limb.  I have ordered an ultrasound to rule out a DVT.   2. Hypertriglyceridemia  fenofibrate (TRICOR) 145 MG Tab  -Chronic problem, unclear stability.  I have ordered a lipid panel to reevaluate his triglycerides.  I will refill his fenofibrate for 1 month to cover until we can get his lab results.   3. Gastroesophageal reflux disease, esophagitis presence not specified   Referral to gastroenterology.  -Chronic problem, unstable.  Considering his history of alcohol abuse and chronic GERD I will refer him to GI for further evaluation.  Continue Pepcid.   4. Shortness of breath   albuterol 108 (90 Base) MCG/ACT Aero Soln inhalation aerosol  -Pulmonary function test.  -Chronic problem, unstable.  Patient was told in the past that he has a beginning stages of COPD.  I will refill his rescue inhaler and order pulmonary function test so we can evaluate his current respiratory status.   5. Encounter to establish care     6. Screening for metabolic disorder  HEMOGLOBIN A1C    MICROALBUMIN CREAT RATIO URINE    TSH WITH REFLEX TO FT4   7. Screening for cardiovascular condition  Lipid Profile   8. Need for hepatitis C screening test  HEP C VIRUS ANTIBODY   9. Encounter for vitamin deficiency screening  VITAMIN D,25 HYDROXY   10. Obesity (BMI 30-39.9)  Patient identified as having weight management issue.  Appropriate orders and counseling given.       Any change or worsening of signs or symptoms, patient encouraged to follow-up or report to emergency room for further evaluation. Patient verbalizes understanding and agrees.    Follow-Up: No follow-ups on file.  I will contact the patient with lab and imaging results, we will determine follow-up at that time.      PLEASE NOTE: This dictation was created using voice recognition software. I have made every reasonable attempt to correct obvious errors, but I expect that there are errors of grammar and possibly content that I did not discover before finalizing the note.

## 2019-12-02 NOTE — ASSESSMENT & PLAN NOTE
Dx 3 yrs ago, reports only his tri was high.  He reports his tri was >1500 on dx.  He takes tricor.  Denies side effects.  He and his previous provider had tried to decrease the med and implement dietary changes about a year ago.  About 9 months ago he stopped the medication and rechecked his labs, tri was elevated again, so tricor was restarted and he has been taking consistently for about 6 months.

## 2019-12-02 NOTE — ASSESSMENT & PLAN NOTE
Was told in the past that he was in the early stages of COPD.  He has a rescue inhaler that he uses approx 4 times a week.  He was on spirivia respimat and did not find it helpful.  He reports his SOB is worse with smoking.  The inhaler improves his SOB.

## 2019-12-02 NOTE — ASSESSMENT & PLAN NOTE
"Present for many years.  Per pt he used to be a \"heavy drinker\", he was drinking a 1/5 bottle of liquor daily.  [facility].  He now only drinks several days a week (has been doing this for 3 years).  He takes pepcid, it is helpful. He has never been evaluated.   "

## 2019-12-20 ENCOUNTER — TELEPHONE (OUTPATIENT)
Dept: MEDICAL GROUP | Facility: MEDICAL CENTER | Age: 59
End: 2019-12-20

## 2019-12-20 NOTE — TELEPHONE ENCOUNTER
1. Caller Name: Frederic Bentley Harrington                                           Call Back Number: [phone] (home)         Patient approves a detailed voicemail message: N\A    Famotidine (20 mg & 40mg) are currently back ordered. Can you switch to Ranitidine?

## 2019-12-21 RX ORDER — RANITIDINE 150 MG/1
150 TABLET ORAL 2 TIMES DAILY
Qty: 60 TAB | Refills: 11 | Status: SHIPPED
Start: 2019-12-21 | End: 2020-05-14

## 2019-12-23 ENCOUNTER — TELEPHONE (OUTPATIENT)
Dept: MEDICAL GROUP | Facility: MEDICAL CENTER | Age: 59
End: 2019-12-23

## 2020-01-17 DIAGNOSIS — E78.1 HYPERTRIGLYCERIDEMIA: ICD-10-CM

## 2020-01-20 RX ORDER — FENOFIBRATE 145 MG/1
TABLET, COATED ORAL
Qty: 30 TAB | Refills: 2 | Status: SHIPPED
Start: 2020-01-20 | End: 2020-03-09 | Stop reason: CLARIF

## 2020-01-27 RX ORDER — TIOTROPIUM BROMIDE INHALATION SPRAY 3.12 UG/1
SPRAY, METERED RESPIRATORY (INHALATION)
Qty: 4 G | Refills: 1 | Status: SHIPPED | OUTPATIENT
Start: 2020-01-27 | End: 2020-10-22

## 2020-03-09 DIAGNOSIS — E78.1 HYPERTRIGLYCERIDEMIA: ICD-10-CM

## 2020-03-09 RX ORDER — FENOFIBRATE 134 MG/1
134 CAPSULE ORAL DAILY
Qty: 30 EACH | Refills: 5 | Status: SHIPPED
Start: 2020-03-09 | End: 2020-05-14

## 2020-03-10 NOTE — PROGRESS NOTES
Fenofibrate 145 mg tablet is not covered by his insurance, prior authorization was denied.  However they do cover 134 mg 160 mg doses.  I will order 134 mg dose.

## 2020-03-13 ENCOUNTER — HOSPITAL ENCOUNTER (OUTPATIENT)
Dept: LAB | Facility: MEDICAL CENTER | Age: 60
End: 2020-03-13
Attending: NURSE PRACTITIONER
Payer: MEDICAID

## 2020-03-13 DIAGNOSIS — Z11.59 NEED FOR HEPATITIS C SCREENING TEST: ICD-10-CM

## 2020-03-13 DIAGNOSIS — Z13.6 SCREENING FOR CARDIOVASCULAR CONDITION: ICD-10-CM

## 2020-03-13 DIAGNOSIS — Z13.21 ENCOUNTER FOR VITAMIN DEFICIENCY SCREENING: ICD-10-CM

## 2020-03-13 DIAGNOSIS — Z13.228 SCREENING FOR METABOLIC DISORDER: ICD-10-CM

## 2020-03-13 LAB
25(OH)D3 SERPL-MCNC: 27 NG/ML (ref 30–100)
CHOLEST SERPL-MCNC: 172 MG/DL (ref 100–199)
CREAT UR-MCNC: 235.4 MG/DL
EST. AVERAGE GLUCOSE BLD GHB EST-MCNC: 131 MG/DL
HBA1C MFR BLD: 6.2 % (ref 0–5.6)
HCV AB SER QL: NORMAL
HDLC SERPL-MCNC: 47 MG/DL
LDLC SERPL CALC-MCNC: ABNORMAL MG/DL
MICROALBUMIN UR-MCNC: 7.6 MG/DL
MICROALBUMIN/CREAT UR: 32 MG/G (ref 0–30)
TRIGL SERPL-MCNC: 407 MG/DL (ref 0–149)
TSH SERPL DL<=0.005 MIU/L-ACNC: 2.17 UIU/ML (ref 0.38–5.33)

## 2020-03-13 PROCEDURE — 83036 HEMOGLOBIN GLYCOSYLATED A1C: CPT

## 2020-03-13 PROCEDURE — 84443 ASSAY THYROID STIM HORMONE: CPT

## 2020-03-13 PROCEDURE — 82570 ASSAY OF URINE CREATININE: CPT

## 2020-03-13 PROCEDURE — 82306 VITAMIN D 25 HYDROXY: CPT

## 2020-03-13 PROCEDURE — 86803 HEPATITIS C AB TEST: CPT

## 2020-03-13 PROCEDURE — 82043 UR ALBUMIN QUANTITATIVE: CPT

## 2020-03-13 PROCEDURE — 80061 LIPID PANEL: CPT

## 2020-03-13 PROCEDURE — 36415 COLL VENOUS BLD VENIPUNCTURE: CPT

## 2020-03-17 NOTE — RESULT ENCOUNTER NOTE
Please call pt and let them know I got his lab results.  I would like him to schedule an appointment with me so that we can discuss his triglyceride levels.  His triglyceride level was 407, I am sure much improved from before but we do have some new treatment options at like to talk to him about.  His blood sugar is elevated placing him in the prediabetes category.  He can improve this by increasing physical activity and decreasing carbohydrates in his diet like bread, pasta, rice, candy, cookies, soda…  He is negative for hepatitis C.  His thyroid function is normal.  His vitamin D is slightly low, I would like to see him take an over-the-counter vitamin D3 supplement, 2000 units daily.  If the patient is uncomfortable coming in for a visit, we can plan a time to talk on the phone.  Thank you

## 2020-03-20 ENCOUNTER — TELEPHONE (OUTPATIENT)
Dept: MEDICAL GROUP | Facility: MEDICAL CENTER | Age: 60
End: 2020-03-20

## 2020-03-24 ENCOUNTER — TELEPHONE (OUTPATIENT)
Dept: MEDICAL GROUP | Facility: MEDICAL CENTER | Age: 60
End: 2020-03-24

## 2020-03-24 NOTE — TELEPHONE ENCOUNTER
Previous message given to Pt, Pt is going to call to schedule an appt when he is back from house sitting.

## 2020-04-21 DIAGNOSIS — M79.89 RIGHT LEG SWELLING: ICD-10-CM

## 2020-04-22 ENCOUNTER — HOSPITAL ENCOUNTER (OUTPATIENT)
Dept: RADIOLOGY | Facility: MEDICAL CENTER | Age: 60
End: 2020-04-22
Attending: NURSE PRACTITIONER
Payer: MEDICAID

## 2020-04-22 DIAGNOSIS — M79.89 RIGHT LEG SWELLING: ICD-10-CM

## 2020-04-22 PROCEDURE — 93971 EXTREMITY STUDY: CPT | Mod: RT

## 2020-04-23 NOTE — RESULT ENCOUNTER NOTE
Please call pt and let them know that the ultrasound of his lower extremity is normal.  Please ask him if he is having any ongoing swelling.  Thank you.

## 2020-04-27 RX ORDER — AZITHROMYCIN 250 MG/1
TABLET, FILM COATED ORAL
COMMUNITY
Start: 2020-02-28 | End: 2020-05-14

## 2020-04-27 RX ORDER — LISINOPRIL 40 MG/1
TABLET ORAL
COMMUNITY
Start: 2020-03-25 | End: 2020-05-14 | Stop reason: CLARIF

## 2020-04-27 RX ORDER — AMLODIPINE BESYLATE 5 MG/1
TABLET ORAL
COMMUNITY
Start: 2020-03-27 | End: 2020-05-14

## 2020-04-27 RX ORDER — FAMOTIDINE 40 MG/1
TABLET, FILM COATED ORAL
COMMUNITY
Start: 2020-04-09 | End: 2020-05-14

## 2020-04-27 RX ORDER — CYCLOBENZAPRINE HCL 10 MG
TABLET ORAL
COMMUNITY
Start: 2020-03-27 | End: 2024-02-16

## 2020-05-14 ENCOUNTER — OFFICE VISIT (OUTPATIENT)
Dept: MEDICAL GROUP | Facility: MEDICAL CENTER | Age: 60
End: 2020-05-14
Attending: NURSE PRACTITIONER
Payer: MEDICAID

## 2020-05-14 VITALS
TEMPERATURE: 98 F | DIASTOLIC BLOOD PRESSURE: 84 MMHG | HEIGHT: 68 IN | SYSTOLIC BLOOD PRESSURE: 136 MMHG | BODY MASS INDEX: 34.39 KG/M2 | HEART RATE: 97 BPM | OXYGEN SATURATION: 92 % | WEIGHT: 226.9 LBS

## 2020-05-14 DIAGNOSIS — R06.02 SHORTNESS OF BREATH: ICD-10-CM

## 2020-05-14 DIAGNOSIS — R80.9 MICROALBUMINURIA: ICD-10-CM

## 2020-05-14 DIAGNOSIS — K21.9 GASTROESOPHAGEAL REFLUX DISEASE, ESOPHAGITIS PRESENCE NOT SPECIFIED: ICD-10-CM

## 2020-05-14 DIAGNOSIS — E55.9 VITAMIN D INSUFFICIENCY: ICD-10-CM

## 2020-05-14 DIAGNOSIS — E78.1 HYPERTRIGLYCERIDEMIA: ICD-10-CM

## 2020-05-14 PROCEDURE — 99213 OFFICE O/P EST LOW 20 MIN: CPT | Performed by: NURSE PRACTITIONER

## 2020-05-14 PROCEDURE — 99214 OFFICE O/P EST MOD 30 MIN: CPT | Performed by: NURSE PRACTITIONER

## 2020-05-14 RX ORDER — ICOSAPENT ETHYL 500 MG/1
CAPSULE ORAL
Qty: 240 CAP | Status: CANCELLED | OUTPATIENT
Start: 2020-05-14

## 2020-05-14 RX ORDER — ALBUTEROL SULFATE 90 UG/1
1-2 AEROSOL, METERED RESPIRATORY (INHALATION) EVERY 4 HOURS PRN
Qty: 8.5 G | Refills: 11 | Status: SHIPPED | OUTPATIENT
Start: 2020-05-14 | End: 2020-10-22 | Stop reason: SDUPTHER

## 2020-05-14 RX ORDER — ATORVASTATIN CALCIUM 20 MG/1
20 TABLET, FILM COATED ORAL DAILY
Qty: 30 TAB | Refills: 2 | Status: SHIPPED | OUTPATIENT
Start: 2020-05-14 | End: 2020-10-14 | Stop reason: SDUPTHER

## 2020-05-14 RX ORDER — ERGOCALCIFEROL 1.25 MG/1
50000 CAPSULE ORAL
Qty: 12 CAP | Refills: 1 | Status: SHIPPED | OUTPATIENT
Start: 2020-05-14 | End: 2020-10-16 | Stop reason: SDUPTHER

## 2020-05-14 RX ORDER — LISINOPRIL 5 MG/1
5 TABLET ORAL DAILY
Qty: 30 TAB | Refills: 2 | Status: SHIPPED | OUTPATIENT
Start: 2020-05-14 | End: 2020-09-04 | Stop reason: SDUPTHER

## 2020-05-14 RX ORDER — ICOSAPENT ETHYL 1000 MG/1
2 CAPSULE ORAL
Qty: 120 CAP | Refills: 3 | Status: SHIPPED | OUTPATIENT
Start: 2020-06-17 | End: 2020-10-22 | Stop reason: SDUPTHER

## 2020-05-14 ASSESSMENT — ENCOUNTER SYMPTOMS
WHEEZING: 0
BLOOD IN STOOL: 0
FEVER: 0
CHILLS: 0
WEIGHT LOSS: 0
DIARRHEA: 0
CONSTIPATION: 0
ABDOMINAL PAIN: 0
PALPITATIONS: 0
COUGH: 0
SHORTNESS OF BREATH: 0

## 2020-05-14 ASSESSMENT — FIBROSIS 4 INDEX: FIB4 SCORE: .3638034375544994603

## 2020-05-14 NOTE — ASSESSMENT & PLAN NOTE
Last lipid panel on 3/13/20 showed:  Lab Results   Component Value Date/Time    CHOLSTRLTOT 172 03/13/2020 03:30 PM    LDL see below 03/13/2020 03:30 PM    HDL 47 03/13/2020 03:30 PM    TRIGLYCERIDE 407 (H) 03/13/2020 03:30 PM       Dx about 3 years ago.  Tri was >1200, he was started on fenofibrate on varying doses.

## 2020-05-14 NOTE — ASSESSMENT & PLAN NOTE
New finding on 3/13/2020, ACR was 32.  His BP today is 136/84.  Initial reading showed 150s over 80s.  Denies previous use of lisinopril although it was on his med list.

## 2020-05-14 NOTE — PROGRESS NOTES
Chief Complaint   Patient presents with   • Follow-Up   • Lab Results       Subjective:     HPI:   Frederic You is a 59 y.o. male here to discuss the evaluation and management of:        Hypertriglyceridemia  Last lipid panel on 3/13/20 showed:  Lab Results   Component Value Date/Time    CHOLSTRLTOT 172 03/13/2020 03:30 PM    LDL see below 03/13/2020 03:30 PM    HDL 47 03/13/2020 03:30 PM    TRIGLYCERIDE 407 (H) 03/13/2020 03:30 PM       Dx about 3 years ago.  Tri was >1200, he was started on fenofibrate on varying doses.        Microalbuminuria  New finding on 3/13/2020, ACR was 32.  His BP today is 136/84.  Initial reading showed 150s over 80s.  Denies previous use of lisinopril although it was on his med list.      ROS  Review of Systems   Constitutional: Negative for chills, fever, malaise/fatigue and weight loss.   Respiratory: Negative for cough, shortness of breath and wheezing.    Cardiovascular: Negative for chest pain, palpitations and leg swelling.   Gastrointestinal: Negative for abdominal pain, blood in stool, constipation and diarrhea.         No Known Allergies    Current medicines (including changes today)  Current Outpatient Medications   Medication Sig Dispense Refill   • atorvastatin (LIPITOR) 20 MG Tab Take 1 Tab by mouth every day. 30 Tab 2   • lisinopril (PRINIVIL) 5 MG Tab Take 1 Tab by mouth every day. 30 Tab 2   • ergocalciferol (DRISDOL) 88906 UNIT capsule Take 1 Cap by mouth every 14 days. 12 Cap 1   • albuterol 108 (90 Base) MCG/ACT Aero Soln inhalation aerosol Inhale 1-2 Puffs by mouth every four hours as needed. 8.5 g 11   • Icosapent Ethyl 1 g Cap Take 2 g by mouth 2 times daily with meals as needed. 120 Cap 3   • esomeprazole (NEXIUM 24HR) 20 MG capsule Take 1 Cap by mouth every morning before breakfast. 30 Cap 5   • cyclobenzaprine (FLEXERIL) 10 MG Tab      • SPIRIVA RESPIMAT 2.5 MCG/ACT Aero Soln INHALE 2 PUFFS ORALLY ONCE DAILY 4 g 1     No current facility-administered  "medications for this visit.        Social History     Tobacco Use   • Smoking status: Current Every Day Smoker     Packs/day: 1.00     Years: 38.00     Pack years: 38.00     Types: Cigarettes   • Smokeless tobacco: Never Used   Substance Use Topics   • Alcohol use: Yes     Frequency: 2-3 times a week     Drinks per session: 1 or 2     Binge frequency: Monthly     Comment: one pint per day   • Drug use: No       Patient Active Problem List    Diagnosis Date Noted   • Microalbuminuria 05/14/2020   • Vitamin D insufficiency 05/14/2020   • Encounter to establish care 12/02/2019   • Obesity (BMI 30-39.9) 12/02/2019   • Hypertriglyceridemia 12/02/2019   • Right leg swelling 12/02/2019   • Shortness of breath 12/02/2019   • GERD (gastroesophageal reflux disease) 09/10/2019   • Tobacco abuse 09/10/2019       Family History   Problem Relation Age of Onset   • Diabetes Mother    • Hypertension Mother    • Diabetes Father           Objective:     /84 (BP Location: Left arm, Patient Position: Sitting, BP Cuff Size: Adult)   Pulse 97   Temp 36.7 °C (98 °F) (Temporal)   Ht 1.727 m (5' 8\")   Wt 102.9 kg (226 lb 14.4 oz)   SpO2 92%  Body mass index is 34.5 kg/m².    Physical Exam:  Physical Exam   Constitutional: He is oriented to person, place, and time and well-developed, well-nourished, and in no distress. No distress.   HENT:   Head: Normocephalic.   Right Ear: Tympanic membrane and external ear normal.   Left Ear: Tympanic membrane and external ear normal.   Eyes: Pupils are equal, round, and reactive to light. Conjunctivae and EOM are normal.   Neck: Normal range of motion. Neck supple. No tracheal deviation present.   Cardiovascular: Normal rate, regular rhythm, normal heart sounds and intact distal pulses.   Pulmonary/Chest: Effort normal and breath sounds normal.   Abdominal: Soft. Bowel sounds are normal.   Musculoskeletal: Normal range of motion.   Lymphadenopathy:        Head (right side): No preauricular " adenopathy present.        Head (left side): No preauricular adenopathy present.     He has no cervical adenopathy.   Neurological: He is alert and oriented to person, place, and time. He has normal sensation, normal strength and intact cranial nerves. Gait normal.   Skin: Skin is warm and dry.   Psychiatric: Affect and judgment normal.       Assessment and Plan:     The following treatment plan was discussed:    1. Hypertriglyceridemia  atorvastatin (LIPITOR) 20 MG Tab    Icosapent Ethyl 1 g Cap  -Chronic problem, unstable.  He ran out of fenofibrate in March and had been spreading up the dose prior to that.  We will stop fenofibrate and initiate atorvastatin 20 mg daily and Vascepa 2 g twice daily with meals.  Potential side effects and discontinuation criteria discussed with patient.   2. Microalbuminuria  lisinopril (PRINIVIL) 5 MG Tab  -New finding, unstable.  Considering his mildly elevated blood pressure and increased ACR we will initiate lisinopril 5 mg daily.  Attentional side effects and discontinuation criteria discussed with patient.   3. Vitamin D insufficiency  ergocalciferol (DRISDOL) 12740 UNIT capsule  -Chronic problem, unstable.  Vitamin D level was 27 in March 2020.  We will start ergocalciferol 50,000 units every 14 days.   4. Shortness of breath  albuterol 108 (90 Base) MCG/ACT Aero Soln inhalation aerosol  -Problem, stable.  Medication refill.   5. Gastroesophageal reflux disease, esophagitis presence not specified  esomeprazole (NEXIUM 24HR) 20 MG capsule  -New problem, stable medication refill.       Any change or worsening of signs or symptoms, patient encouraged to follow-up or report to emergency room for further evaluation. Patient verbalizes understanding and agrees.    Follow-Up: Return in about 4 weeks (around 6/11/2020) for Triglycerides.      PLEASE NOTE: This dictation was created using voice recognition software. I have made every reasonable attempt to correct obvious errors, but I  expect that there are errors of grammar and possibly content that I did not discover before finalizing the note.

## 2020-05-14 NOTE — PATIENT INSTRUCTIONS
Carbohydrates- bread, rice, pasta, crackers, candy, cookies, soda, sweet tea, alcohol.  If you are doing grains, choose a whole grain.    Check out the American Diabetes Association website at ada.org    New medications:  1.  Atorvastatin (Lipitor) 20 mg nightly.  This is for cholesterol  2. Icosapent (Vascepa) 2grams daily with meals.  This is for cholesterol-this is the fancy fish oil pill-we will require prior authorization so do not be surprised if you do not have this medication today.  3.  Lisinopril 5 mg daily- this is a blood pressure pill that will protect your kidneys.  4 ergocalciferol-vitamin D

## 2020-06-09 ENCOUNTER — TELEPHONE (OUTPATIENT)
Dept: MEDICAL GROUP | Facility: MEDICAL CENTER | Age: 60
End: 2020-06-09

## 2020-06-09 NOTE — TELEPHONE ENCOUNTER
MEDICATION PRIOR AUTHORIZATION NEEDED:    1. Name of Medication: vascepa 1gm    2. Requested By (Name of Pharmacy): [facility]     3. Is insurance on file current? yes    4. What is the name & phone number of the 3rd party payor? [place] 06/09/2020 pending

## 2020-06-10 ENCOUNTER — TELEPHONE (OUTPATIENT)
Dept: MEDICAL GROUP | Facility: MEDICAL CENTER | Age: 60
End: 2020-06-10

## 2020-07-06 ENCOUNTER — OFFICE VISIT (OUTPATIENT)
Dept: URGENT CARE | Facility: CLINIC | Age: 60
End: 2020-07-06
Payer: MEDICAID

## 2020-07-06 VITALS
HEART RATE: 108 BPM | WEIGHT: 220 LBS | HEIGHT: 68 IN | RESPIRATION RATE: 16 BRPM | OXYGEN SATURATION: 93 % | SYSTOLIC BLOOD PRESSURE: 108 MMHG | TEMPERATURE: 96.8 F | DIASTOLIC BLOOD PRESSURE: 68 MMHG | BODY MASS INDEX: 33.34 KG/M2

## 2020-07-06 DIAGNOSIS — L03.213 PRESEPTAL CELLULITIS OF LEFT EYE: ICD-10-CM

## 2020-07-06 PROCEDURE — 99214 OFFICE O/P EST MOD 30 MIN: CPT | Mod: 25 | Performed by: PHYSICIAN ASSISTANT

## 2020-07-06 RX ORDER — ERYTHROMYCIN 5 MG/G
1 OINTMENT OPHTHALMIC 3 TIMES DAILY
Qty: 15 G | Refills: 0 | Status: SHIPPED | OUTPATIENT
Start: 2020-07-06 | End: 2020-07-16

## 2020-07-06 RX ORDER — CLINDAMYCIN HYDROCHLORIDE 300 MG/1
300 CAPSULE ORAL 3 TIMES DAILY
Qty: 30 CAP | Refills: 0 | Status: SHIPPED | OUTPATIENT
Start: 2020-07-06 | End: 2020-07-16

## 2020-07-06 ASSESSMENT — FIBROSIS 4 INDEX: FIB4 SCORE: .3638034375544994603

## 2020-07-06 ASSESSMENT — ENCOUNTER SYMPTOMS
DOUBLE VISION: 0
EYE PAIN: 1
SINUS PAIN: 0
SHORTNESS OF BREATH: 0
EYE DISCHARGE: 1
BLURRED VISION: 0
EYE REDNESS: 1
COUGH: 0
PALPITATIONS: 0
FEVER: 0
PHOTOPHOBIA: 0
CHILLS: 0
SORE THROAT: 0
HEADACHES: 0

## 2020-07-07 NOTE — PATIENT INSTRUCTIONS
Preseptal Cellulitis, Adult    Preseptal cellulitis is an infection of the eyelid and the tissues around the eye (periorbital area). The infection causes painful swelling and redness. This condition may also be called periorbital cellulitis.  In most cases, the condition can be treated with antibiotic medicine at home. It is important to treat preseptal cellulitis right away so that it does not get worse. If it gets worse, it can spread to the eye socket and eye muscles (orbital cellulitis). Orbital cellulitis is a medical emergency.  What are the causes?  Preseptal cellulitis is most commonly caused by bacteria. In rare cases, it can be caused by a virus or fungus. The germs that cause preseptal cellulitis may come from:  · A sinus infection that spreads near the eyes.  · An injury near the eye, such as a scratch, animal bite, or insect bite.  · A skin rash that becomes infected, such as eczema or poison ivy.  · An infected pimple on the eyelid (stye).  · Infection after eyelid surgery or injury.  What increases the risk?  You are more likely to develop this condition if:  · You have a weakened disease-fighting system (immune system).  · You have a medical condition that raises your risk for sinus infections, such as nasal polyps.  What are the signs or symptoms?  Symptoms of this condition usually develop suddenly. Symptoms may include:  · Eyelids that are red and swollen and feel unusually hot.  · Fever.  · Difficulty opening the eye.  · Headache.  · Facial pain.  How is this diagnosed?  This condition may be diagnosed based on your symptoms, your medical history, and an eye exam. You may have tests, such as:  · Blood tests.  · CT scan.  · MRI.  How is this treated?  This condition is treated with antibiotic medicines. These may be given by mouth (orally), through an IV, or as a shot. In rare cases, you may need surgery to drain an infected area.  Follow these instructions at home:  Medicines  · If you were  prescribed an antibiotic to take at home, take it as told by your health care provider. Do not stop taking the antibiotic even if you start to feel better.  · Take over-the-counter and prescription medicines only as told by your health care provider.  Eye Care  · Do not use eye drops without first getting approval from your health care provider.  · Do not touch or rub your eye. If you wear contact lenses, do not wear them until your health care provider approves.  · Keep the eye area clean and dry.  · Wash the eye area with a clean washcloth, warm water, and baby shampoo or mild soap.  · To help relieve discomfort, place a clean washcloth that is wet with warm water over your eye. Leave the washcloth on for a few minutes, then remove it.  General instructions  · Wash your hands with soap and water often. If soap and water are not available, use hand .  · Do not use any products that contain nicotine or tobacco, such as cigarettes and e-cigarettes. If you need help quitting, ask your health care provider.  · Drink enough fluid to keep your urine pale yellow.  · Ask your health care provider if it is safe for you to drive.  · Stay up to date on your vaccinations.  · Keep all follow-up visits as told by your health care provider. This includes any visits with an eye specialist (ophthalmologist) or dentist. This is important.  Get help right away if:  · You have new symptoms.  · Your symptoms get worse or do not get better with treatment.  · You have a fever.  · Your vision becomes blurry or gets worse in any way.  · Your eye looks like it is sticking out or bulging out (proptosis).  · You have trouble moving your eyes.  · You have a severe headache.  · You have neck stiffness or severe neck pain.  Summary  · Preseptal cellulitis is an infection of the eyelid and the tissues around the eye.  · Symptoms of preseptal cellulitis usually develop suddenly and include red and swollen eyelids, fever, difficulty  opening the eye, headache, and facial pain.  · This condition is treated with antibiotic medicines. Do not stop taking the antibiotic even if you start to feel better.  This information is not intended to replace advice given to you by your health care provider. Make sure you discuss any questions you have with your health care provider.  Document Released: 01/20/2012 Document Revised: 11/30/2018 Document Reviewed: 10/10/2018  [employer] Patient Education © 2020 [employer]

## 2020-07-07 NOTE — PROGRESS NOTES
Subjective:      Frederic You is a 59 y.o. male who presents with Eye Problem (Left eye inflmation x 4 days)            Eye Problem    The left eye is affected. This is a new problem. The current episode started in the past 7 days. The problem occurs constantly. The problem has been rapidly worsening. The injury mechanism is unknown. The pain is moderate. He does not wear contacts. Associated symptoms include an eye discharge and eye redness. Pertinent negatives include no blurred vision, double vision, fever or photophobia.       Review of Systems   Constitutional: Negative for chills, fever and malaise/fatigue.   HENT: Negative for congestion, ear pain, sinus pain and sore throat.    Eyes: Positive for pain, discharge and redness. Negative for blurred vision, double vision and photophobia.   Respiratory: Negative for cough and shortness of breath.    Cardiovascular: Negative for chest pain and palpitations.   Skin: Negative for rash.   Neurological: Negative for headaches.   All other systems reviewed and are negative.    PMH:  has a past medical history of COPD (chronic obstructive pulmonary disease) ([facility]), Seizure ([facility]) (2/2013), and Skull fractures ([facility]).  MEDS:   Current Outpatient Medications:   •  clindamycin (CLEOCIN) 300 MG Cap, Take 1 Cap by mouth 3 times a day for 10 days., Disp: 30 Cap, Rfl: 0  •  erythromycin 5 MG/GM Ointment, Place 1 cm in left eye 3 times a day for 10 days. Apply 1 cm ribbon underneath affected lower eyelid(s) 4 times daily for 10 days., Disp: 15 g, Rfl: 0  •  atorvastatin (LIPITOR) 20 MG Tab, Take 1 Tab by mouth every day., Disp: 30 Tab, Rfl: 2  •  lisinopril (PRINIVIL) 5 MG Tab, Take 1 Tab by mouth every day., Disp: 30 Tab, Rfl: 2  •  ergocalciferol (DRISDOL) 91332 UNIT capsule, Take 1 Cap by mouth every 14 days., Disp: 12 Cap, Rfl: 1  •  albuterol 108 (90 Base) MCG/ACT Aero Soln inhalation aerosol, Inhale 1-2 Puffs by mouth every four hours as needed., Disp: 8.5 g, Rfl:  "11  •  Icosapent Ethyl 1 g Cap, Take 2 g by mouth 2 times daily with meals as needed., Disp: 120 Cap, Rfl: 3  •  esomeprazole (NEXIUM 24HR) 20 MG capsule, Take 1 Cap by mouth every morning before breakfast., Disp: 30 Cap, Rfl: 5  •  cyclobenzaprine (FLEXERIL) 10 MG Tab, , Disp: , Rfl:   •  SPIRIVA RESPIMAT 2.5 MCG/ACT Aero Soln, INHALE 2 PUFFS ORALLY ONCE DAILY, Disp: 4 g, Rfl: 1    Current Facility-Administered Medications:   •  cefTRIAXone (ROCEPHIN) 1 g, lidocaine (XYLOCAINE) 1 % 3.6 mL for IM use, 1 g, Intramuscular, Once, JUANA FernandezALokesh-LES.  ALLERGIES: No Known Allergies  SURGHX:   Past Surgical History:   Procedure Laterality Date   • INGUINAL HERNIA REPAIR Left 6/25/2015    Procedure: INGUINAL HERNIA REPAIR;  Surgeon: Nestor Vazquez M.D.;  Location: SURGERY SAME DAY [facility];  Service:    • LACERATION REPAIR  1979     SOCHX:  reports that he has been smoking cigarettes. He has a 38.00 pack-year smoking history. He has never used smokeless tobacco. He reports current alcohol use. He reports that he does not use drugs.  FH: Family history was reviewed, no pertinent findings to report  Medications, Allergies, and current problem list reviewed today in Epic       Objective:     Blood Pressure 108/68 (BP Location: Left arm, Patient Position: Sitting, BP Cuff Size: Adult)   Pulse (Abnormal) 108   Temperature 36 °C (96.8 °F) (Temporal)   Respiration 16   Height 1.727 m (5' 8\")   Weight 99.8 kg (220 lb)   Oxygen Saturation 93%   Body Mass Index 33.45 kg/m²      Physical Exam  Vitals signs reviewed.   Constitutional:       General: He is not in acute distress.     Appearance: He is well-developed. He is not ill-appearing or toxic-appearing.   HENT:      Head: Normocephalic and atraumatic.      Right Ear: Hearing, tympanic membrane, ear canal and external ear normal.      Left Ear: Hearing, tympanic membrane, ear canal and external ear normal.      Nose: Nose normal.      Mouth/Throat:      Pharynx: " Uvula midline. No oropharyngeal exudate.   Eyes:      General: Lids are normal. Lids are everted, no foreign bodies appreciated.      Extraocular Movements: Extraocular movements intact.      Conjunctiva/sclera:      Right eye: Right conjunctiva is injected.      Left eye: Left conjunctiva is injected. Exudate present.      Pupils: Pupils are equal, round, and reactive to light.     Neck:      Musculoskeletal: Full passive range of motion without pain, normal range of motion and neck supple.   Cardiovascular:      Rate and Rhythm: Regular rhythm. Tachycardia present.      Heart sounds: Normal heart sounds. No murmur. No friction rub. No gallop.    Pulmonary:      Effort: Pulmonary effort is normal. No respiratory distress.      Breath sounds: Normal breath sounds. No decreased breath sounds, wheezing or rales.   Chest:      Chest wall: No tenderness.   Musculoskeletal: Normal range of motion.         General: No tenderness or deformity.   Skin:     General: Skin is warm and dry.      Findings: No rash.   Neurological:      Mental Status: He is alert and oriented to person, place, and time.   Psychiatric:         Speech: Speech normal.         Behavior: Behavior normal.         Thought Content: Thought content normal.         Judgment: Judgment normal.                 Assessment/Plan:     Pt is a 59-year-old male who presents for evaluation of eye pain.  Pt states for the last 5 days he has had swelling of the left eye and is now completely swollen shut.  Pt denies headache, vomiting, or contact lens use.  Vital signs normal. .   Affected eye shows pupils equal and reactive to light.  Cornea is clear without haziness or blood.  Full extraocular motion without pain.  There is no ciliary flush or consensual photophobia.  There is edema, erythema or tenderness of the surrounding left orbit.  This appears to be preseptal cellulitis with no evidence of orbital cellulitis at this time.  At this time we will treat  aggressively with outpatient medications.  He is to follow-up with primary care or urgent care in 2 to 3 days for reevaluation.  If symptoms continue to worsen he is instructed to go to the emergency department for further evaluation and treatment.      1. Preseptal cellulitis of left eye    - clindamycin (CLEOCIN) 300 MG Cap; Take 1 Cap by mouth 3 times a day for 10 days.  Dispense: 30 Cap; Refill: 0  - erythromycin 5 MG/GM Ointment; Place 1 cm in left eye 3 times a day for 10 days. Apply 1 cm ribbon underneath affected lower eyelid(s) 4 times daily for 10 days.  Dispense: 15 g; Refill: 0  - cefTRIAXone (ROCEPHIN) 1 g, lidocaine (XYLOCAINE) 1 % 3.6 mL for IM use    Differential diagnosis, natural history, supportive care discussed. Follow-up with primary care provider within 7-10 days, emergency room precautions discussed.  Patient and/or family appears understanding of information.  Handout and review of patients diagnosis and treatment was discussed extensively.

## 2020-08-05 ENCOUNTER — OFFICE VISIT (OUTPATIENT)
Dept: MEDICAL GROUP | Facility: MEDICAL CENTER | Age: 60
End: 2020-08-05
Attending: NURSE PRACTITIONER
Payer: MEDICAID

## 2020-08-05 VITALS
HEART RATE: 99 BPM | HEIGHT: 68 IN | WEIGHT: 227 LBS | DIASTOLIC BLOOD PRESSURE: 86 MMHG | BODY MASS INDEX: 34.4 KG/M2 | SYSTOLIC BLOOD PRESSURE: 128 MMHG | OXYGEN SATURATION: 95 % | TEMPERATURE: 97.3 F

## 2020-08-05 DIAGNOSIS — Z79.899 ON ANGIOTENSIN-CONVERTING ENZYME (ACE) INHIBITORS: ICD-10-CM

## 2020-08-05 DIAGNOSIS — Z12.11 SCREEN FOR COLON CANCER: ICD-10-CM

## 2020-08-05 DIAGNOSIS — E78.1 HYPERTRIGLYCERIDEMIA: ICD-10-CM

## 2020-08-05 DIAGNOSIS — R80.9 MICROALBUMINURIA: ICD-10-CM

## 2020-08-05 PROCEDURE — 99213 OFFICE O/P EST LOW 20 MIN: CPT | Performed by: NURSE PRACTITIONER

## 2020-08-05 ASSESSMENT — ENCOUNTER SYMPTOMS
WHEEZING: 0
WEIGHT LOSS: 0
COUGH: 0
CHILLS: 0
CONSTIPATION: 0
PALPITATIONS: 0
BLOOD IN STOOL: 0
DIARRHEA: 0
ABDOMINAL PAIN: 0
FEVER: 0
SHORTNESS OF BREATH: 0

## 2020-08-05 ASSESSMENT — FIBROSIS 4 INDEX: FIB4 SCORE: .3638034375544994603

## 2020-08-05 NOTE — PROGRESS NOTES
Chief Complaint   Patient presents with   • Follow-Up       Subjective:     HPI:   Frederic You is a 59 y.o. male here to discuss the evaluation and management of:        Microalbuminuria  Has been tolerating lisinopril 5 mg daily well.      Hypertriglyceridemia  Patient reports he was able to get his statin and Vascepa, however he misread the label and was taking 1 capsules twice a day as opposed to 2 capsules twice a day.  He has recently started exercising, albeit irregularly.  He reports he knows he needs to exercise more.  He has been doing cardio by riding his bike and doing some push-ups and sit ups at home.  He reports he is trying to decrease the amount of alcohol he drinks and has been somewhat successful.  I side effects to the medication.      ROS  Review of Systems   Constitutional: Negative for chills, fever, malaise/fatigue and weight loss.   Respiratory: Negative for cough, shortness of breath and wheezing.    Cardiovascular: Negative for chest pain, palpitations and leg swelling.   Gastrointestinal: Negative for abdominal pain, blood in stool, constipation and diarrhea.         No Known Allergies    Current medicines (including changes today)  Current Outpatient Medications   Medication Sig Dispense Refill   • atorvastatin (LIPITOR) 20 MG Tab Take 1 Tab by mouth every day. 30 Tab 2   • lisinopril (PRINIVIL) 5 MG Tab Take 1 Tab by mouth every day. 30 Tab 2   • ergocalciferol (DRISDOL) 97850 UNIT capsule Take 1 Cap by mouth every 14 days. 12 Cap 1   • albuterol 108 (90 Base) MCG/ACT Aero Soln inhalation aerosol Inhale 1-2 Puffs by mouth every four hours as needed. 8.5 g 11   • Icosapent Ethyl 1 g Cap Take 2 g by mouth 2 times daily with meals as needed. 120 Cap 3   • esomeprazole (NEXIUM 24HR) 20 MG capsule Take 1 Cap by mouth every morning before breakfast. 30 Cap 5   • cyclobenzaprine (FLEXERIL) 10 MG Tab      • SPIRIVA RESPIMAT 2.5 MCG/ACT Aero Soln INHALE 2 PUFFS ORALLY ONCE DAILY 4 g 1  "    No current facility-administered medications for this visit.        Social History     Tobacco Use   • Smoking status: Current Every Day Smoker     Packs/day: 1.00     Years: 38.00     Pack years: 38.00     Types: Cigarettes   • Smokeless tobacco: Never Used   Substance Use Topics   • Alcohol use: Yes     Frequency: 2-3 times a week     Drinks per session: 1 or 2     Binge frequency: Monthly     Comment: one pint per day   • Drug use: No       Patient Active Problem List    Diagnosis Date Noted   • Microalbuminuria 05/14/2020   • Vitamin D insufficiency 05/14/2020   • Encounter to establish care 12/02/2019   • Obesity (BMI 30-39.9) 12/02/2019   • Hypertriglyceridemia 12/02/2019   • Right leg swelling 12/02/2019   • Shortness of breath 12/02/2019   • GERD (gastroesophageal reflux disease) 09/10/2019   • Tobacco abuse 09/10/2019       Family History   Problem Relation Age of Onset   • Diabetes Mother    • Hypertension Mother    • Diabetes Father           Objective:     /86 (BP Location: Right arm, Patient Position: Sitting, BP Cuff Size: Adult)   Pulse 99   Temp 36.3 °C (97.3 °F) (Temporal)   Ht 1.727 m (5' 8\")   Wt 103 kg (227 lb)   SpO2 95%  Body mass index is 34.52 kg/m².    Physical Exam:  Physical Exam   Constitutional: He is oriented to person, place, and time and well-developed, well-nourished, and in no distress. No distress.   HENT:   Head: Normocephalic.   Right Ear: Tympanic membrane and external ear normal.   Left Ear: Tympanic membrane and external ear normal.   Eyes: Pupils are equal, round, and reactive to light. Conjunctivae and EOM are normal.   Neck: Normal range of motion. Neck supple. No tracheal deviation present.   Cardiovascular: Normal rate, regular rhythm, normal heart sounds and intact distal pulses.   Pulmonary/Chest: Effort normal and breath sounds normal.   Abdominal: Soft. Bowel sounds are normal.   Musculoskeletal: Normal range of motion.   Lymphadenopathy:        Head " (right side): No preauricular adenopathy present.        Head (left side): No preauricular adenopathy present.     He has no cervical adenopathy.   Neurological: He is alert and oriented to person, place, and time. He has normal sensation, normal strength and intact cranial nerves. Gait normal.   Skin: Skin is warm and dry.   Psychiatric: Affect and judgment normal.       Assessment and Plan:     The following treatment plan was discussed:    1. Hypertriglyceridemia  Lipid Profile  -Chronic problem, unclear stability.  We will recheck his lipid profile in approximately several weeks after he is consistently taking his vascepa as directed.   2. Microalbuminuria  MICROALBUMIN CREAT RATIO URINE  -Chronic problem, unclear stability.  We will assess the efficacy of lisinopril.   3. Screen for colon cancer  OCCULT BLOOD FECES IMMUNOASSAY (FIT)   4. On angiotensin-converting enzyme (ACE) inhibitors  Comp Metabolic Panel  -Patient has been on lisinopril for approximately 1 month.  We will check a CMP.       Any change or worsening of signs or symptoms, patient encouraged to follow-up or report to emergency room for further evaluation. Patient verbalizes understanding and agrees.    Follow-Up: Return for TBD by lab results.      PLEASE NOTE: This dictation was created using voice recognition software. I have made every reasonable attempt to correct obvious errors, but I expect that there are errors of grammar and possibly content that I did not discover before finalizing the note.

## 2020-08-05 NOTE — ASSESSMENT & PLAN NOTE
Patient reports he was able to get his statin and Vascepa, however he misread the label and was taking 1 capsules twice a day as opposed to 2 capsules twice a day.  He has recently started exercising, albeit irregularly.  He reports he knows he needs to exercise more.  He has been doing cardio by riding his bike and doing some push-ups and sit ups at home.  He reports he is trying to decrease the amount of alcohol he drinks and has been somewhat successful.  I side effects to the medication.

## 2020-08-05 NOTE — PATIENT INSTRUCTIONS
Schedule an APPT with GI for your heartburn:  [facility]   [address] [facility] [address]  Phone: [phone]  Fax: [phone]

## 2020-08-06 NOTE — PROGRESS NOTES
Last seen:3/099/2020  Last refilled:10/28/2019  F/u appointment:8/10/2020     · 2 RN skin check complete with Rachael MORIN  · Devices in place- Bilateral PIVs   · Skin assessed under devices- yes  · Confirmed pressure ulcers found on N/A  · Also noted- Cut on the left side of patients cheek due to shaving; pt has scabs that are dry and open to air on posterior side of right lower extremity; right leg from inner thigh down to anterior foot is red, warm to touch due to cellulitis  · The following interventions are in place - pressure redistribution mattress, encourage to ambulate as much as possible, encourage to turn q2hrs

## 2020-09-04 ENCOUNTER — PHARMACY VISIT (OUTPATIENT)
Dept: PHARMACY | Facility: MEDICAL CENTER | Age: 60
End: 2020-09-04
Payer: COMMERCIAL

## 2020-09-04 DIAGNOSIS — R80.9 MICROALBUMINURIA: ICD-10-CM

## 2020-09-04 PROCEDURE — RXMED WILLOW AMBULATORY MEDICATION CHARGE: Performed by: NURSE PRACTITIONER

## 2020-09-09 ENCOUNTER — HOSPITAL ENCOUNTER (OUTPATIENT)
Facility: MEDICAL CENTER | Age: 60
End: 2020-09-09
Attending: NURSE PRACTITIONER
Payer: MEDICAID

## 2020-09-09 PROCEDURE — 82274 ASSAY TEST FOR BLOOD FECAL: CPT

## 2020-09-09 PROCEDURE — RXMED WILLOW AMBULATORY MEDICATION CHARGE: Performed by: NURSE PRACTITIONER

## 2020-09-09 RX ORDER — LISINOPRIL 5 MG/1
5 TABLET ORAL DAILY
Qty: 30 TAB | Refills: 5 | Status: SHIPPED | OUTPATIENT
Start: 2020-09-09 | End: 2020-10-22 | Stop reason: SDUPTHER

## 2020-09-14 DIAGNOSIS — Z12.11 SCREEN FOR COLON CANCER: ICD-10-CM

## 2020-09-14 LAB — AMBIGUOUS DTTM AMBI4: NORMAL

## 2020-09-15 LAB — HEMOCCULT STL QL IA: NEGATIVE

## 2020-09-16 ENCOUNTER — HOSPITAL ENCOUNTER (OUTPATIENT)
Dept: LAB | Facility: MEDICAL CENTER | Age: 60
End: 2020-09-16
Attending: NURSE PRACTITIONER
Payer: MEDICAID

## 2020-09-16 ENCOUNTER — PHARMACY VISIT (OUTPATIENT)
Dept: PHARMACY | Facility: MEDICAL CENTER | Age: 60
End: 2020-09-16
Payer: COMMERCIAL

## 2020-09-16 DIAGNOSIS — R80.9 MICROALBUMINURIA: ICD-10-CM

## 2020-09-16 DIAGNOSIS — E78.1 HYPERTRIGLYCERIDEMIA: ICD-10-CM

## 2020-09-16 DIAGNOSIS — Z79.899 ON ANGIOTENSIN-CONVERTING ENZYME (ACE) INHIBITORS: ICD-10-CM

## 2020-09-16 LAB
ALBUMIN SERPL BCP-MCNC: 4.4 G/DL (ref 3.2–4.9)
ALBUMIN/GLOB SERPL: 1.4 G/DL
ALP SERPL-CCNC: 80 U/L (ref 30–99)
ALT SERPL-CCNC: 26 U/L (ref 2–50)
ANION GAP SERPL CALC-SCNC: 14 MMOL/L (ref 7–16)
AST SERPL-CCNC: 18 U/L (ref 12–45)
BILIRUB SERPL-MCNC: 0.6 MG/DL (ref 0.1–1.5)
BUN SERPL-MCNC: 10 MG/DL (ref 8–22)
CALCIUM SERPL-MCNC: 9.9 MG/DL (ref 8.5–10.5)
CHLORIDE SERPL-SCNC: 97 MMOL/L (ref 96–112)
CHOLEST SERPL-MCNC: 162 MG/DL (ref 100–199)
CO2 SERPL-SCNC: 26 MMOL/L (ref 20–33)
CREAT SERPL-MCNC: 0.81 MG/DL (ref 0.5–1.4)
CREAT UR-MCNC: 348.62 MG/DL
FASTING STATUS PATIENT QL REPORTED: NORMAL
GLOBULIN SER CALC-MCNC: 3.1 G/DL (ref 1.9–3.5)
GLUCOSE SERPL-MCNC: 114 MG/DL (ref 65–99)
HDLC SERPL-MCNC: 47 MG/DL
LDLC SERPL CALC-MCNC: 42 MG/DL
MICROALBUMIN UR-MCNC: 14.6 MG/DL
MICROALBUMIN/CREAT UR: 42 MG/G (ref 0–30)
POTASSIUM SERPL-SCNC: 4.1 MMOL/L (ref 3.6–5.5)
PROT SERPL-MCNC: 7.5 G/DL (ref 6–8.2)
SODIUM SERPL-SCNC: 137 MMOL/L (ref 135–145)
TRIGL SERPL-MCNC: 363 MG/DL (ref 0–149)

## 2020-09-16 PROCEDURE — 82043 UR ALBUMIN QUANTITATIVE: CPT

## 2020-09-16 PROCEDURE — 82570 ASSAY OF URINE CREATININE: CPT

## 2020-09-16 PROCEDURE — 80053 COMPREHEN METABOLIC PANEL: CPT

## 2020-09-16 PROCEDURE — 36415 COLL VENOUS BLD VENIPUNCTURE: CPT

## 2020-09-16 PROCEDURE — 80061 LIPID PANEL: CPT

## 2020-09-30 NOTE — PROGRESS NOTES
Bedside report received from ER RN . Assumed care of pt at 2145 . Pt is awake at this time with no signs of distress. Plan of care discussed with pt and admission profile done on pt. Pt is A&O x 4. Pt is not requiring oxygen at this time. Bed alarm is not needed, bed in lowest position, bed rails up x 2, belongings and call light within reach. Hourly rounding in place.      REQUESTED BY:  I was asked to see this patient in consultation by Amanda GRANADOS.    REASON FOR CONSULT:  RUQ pain    CHIEF COMPLAINT:  RUQ pain    HISTORY OF PRESENT ILLNESS:  Segundo Moreno is a 47 year old male with a h/o sleep apnea, HTN, fatty liver, pancreatitis, obesity presents to the clinic per the recommendations of the ER for RUQ pain.  He was seen in the ER in August for RUQ pain. He states he developed gradual epigastric pain that then started to radiate to the RUQ, flank and back. On day 2 he went to the  and then ultimately transferred to the ER. He denies any other associated symptoms such as n/v, decreased appetite, diarrhea, constipation, fevers, chills. He was eating/drinking normally. A few days prior to the pain he was with friends and was drinking IPA beer.  He reports not in excess, only 2-3.  In the ER he had an elevated lipase of 424, TB 1.9, AST 42, ALT 64. The RUQ u/s was neg, the MRCP was neg.  He reports within 1-2 days his pain was totally resolved and he resumed his normal diet.    He denies any heartburn, abdominal pain, dysphagia, melena, hematochezia, diarrhea, constipation, weight loss, change in bowel habits.  He reports about 5 years ago he had the same symptoms occur. He went to the ER and he states they told him he had pancreatitis and that it was likely from passing a gallstone. He still has a his gallbladder and hasn't had any issues until this recurrence.      REVIEW OF SYSTEMS:  GASTROINTESTINAL: See History of Present Illness. No other gastrointestinal, cardiac, or pulmonary complaints.    PHYSICAL EXAMINATION:  GENERAL: No apparent distress.  VITAL SIGNS: Stable vital signs, reviewed from EMR (electronic medical record).  LUNGS: clear to auscultation no wheezing  CARDIAC: regular rate and rhythm no murmurs   ABDOMEN: Soft, no tenderness, no masses, no hepatosplenomegaly. Bowel sounds appreciated.    Current Outpatient Medications   Medication Sig   • traMADol  (ULTRAM) 50 MG tablet Take 1 tablet by mouth every 6 hours as needed for Pain. No refills from walk-in clinic.   • sumatriptan (IMITREX) 100 MG tablet Take one tablet at the onset of the headache, may repeat dose in 2 hours if needed.   • enalapril-hydrochlorothiazide (VASERETIC) 10-25 MG per tablet Take 1 tablet by mouth daily.     No current facility-administered medications for this visit.      Active Ambulatory Problems     Diagnosis Date Noted   • Overweight(278.02)    • Back pain 03/13/2013   • Essential hypertension, benign 03/13/2013   • Snoring disorder 03/13/2013   • Moderate obstructive sleep apnea 04/05/2013   • Elevated liver enzymes 07/08/2013   • Right inguinal hernia 07/31/2013   • H/O dysplastic nevus 11/25/2013   • Pre-diabetes 06/11/2014   • Nonalcoholic steatohepatitis 06/18/2014   • Epiploic appendagitis 06/03/2020     Resolved Ambulatory Problems     Diagnosis Date Noted   • No Resolved Ambulatory Problems     Past Medical History:   Diagnosis Date   • Essential (primary) hypertension        IMAGING  8/2020 MRCP  IMPRESSION:  Normal MRI/MRCP of the upper abdomen.  In particular the biliary tree is normal without evidence for stone mass or  Stricture.    8/2020 RUQ u/s  IMPRESSION:   1.  Diffusely increased liver echogenicity is nonspecific, but may be seen  with hepatic steatosis or other diffuse hepatocellular disease.  2. No evidence of acute cholecystitis.    5/2020 CT abdomen/pelvis  IMPRESSION:   1. Epiploic appendagitis of the proximal sigmoid colon.  2. Mild hepatic steatosis.    LABS reviewed in Epic  Lipase, CMP, CBC    IMPRESSION/RECOMMENDATIONS:  48 y/o male with h/o HTN and fatty liver who has 1 prior episode of pancreatitis 5 years ago.  Admits to drinking alcohol on average 3 drinks/week.  Patient reports RUQ for 3 days, has totally resolved.  RUQ pain: resolved   Pancreatitis  Fatty liver    -Hepatic panel, lipase   -Discussed with Dr. Jaimes who recommended referral to Dr. Mei  for pancreatitis, with normal imaging.   -Instructed patient to stop drinking alcohol   -Referral placed for Sigifredo at [facility]    Patient verbalized understanding and in agreement with plan.    ROMÁN Rider

## 2020-10-13 NOTE — RESULT ENCOUNTER NOTE
Jarrett De La Garza,  I got your lab results.  You are spilling a little bit more protein from your kidneys than you were about 7 months ago.  We may want to consider increasing your lisinopril dose to 10 mg daily.  Lisinopril helps prevent kidney damage.  I would like you to schedule an appointment with me so that we can check to see if your blood pressure would tolerate an increase in this dose, please call [phone] to schedule.  Your colon cancer screening test was negative for blood in your stool, this is good news.  We can repeat this test again next year.  Nancy

## 2020-10-14 ENCOUNTER — PHARMACY VISIT (OUTPATIENT)
Dept: PHARMACY | Facility: MEDICAL CENTER | Age: 60
End: 2020-10-14
Payer: COMMERCIAL

## 2020-10-14 DIAGNOSIS — E78.1 HYPERTRIGLYCERIDEMIA: ICD-10-CM

## 2020-10-14 PROCEDURE — RXMED WILLOW AMBULATORY MEDICATION CHARGE: Performed by: PHYSICIAN ASSISTANT

## 2020-10-14 PROCEDURE — RXMED WILLOW AMBULATORY MEDICATION CHARGE: Performed by: NURSE PRACTITIONER

## 2020-10-14 RX ORDER — ATORVASTATIN CALCIUM 20 MG/1
20 TABLET, FILM COATED ORAL DAILY
Qty: 30 TAB | Refills: 0 | Status: SHIPPED | OUTPATIENT
Start: 2020-10-14 | End: 2020-10-22 | Stop reason: SDUPTHER

## 2020-10-14 NOTE — TELEPHONE ENCOUNTER
Received request via: Pharmacy    Was the patient seen in the last year in this department? Yes    Does the patient have an active prescription (recently filled or refills available) for medication(s) requested? No refills

## 2020-10-16 ENCOUNTER — PHARMACY VISIT (OUTPATIENT)
Dept: PHARMACY | Facility: MEDICAL CENTER | Age: 60
End: 2020-10-16
Payer: COMMERCIAL

## 2020-10-16 DIAGNOSIS — E55.9 VITAMIN D INSUFFICIENCY: ICD-10-CM

## 2020-10-16 PROCEDURE — RXMED WILLOW AMBULATORY MEDICATION CHARGE: Performed by: NURSE PRACTITIONER

## 2020-10-19 PROCEDURE — RXMED WILLOW AMBULATORY MEDICATION CHARGE: Performed by: NURSE PRACTITIONER

## 2020-10-19 RX ORDER — ERGOCALCIFEROL 1.25 MG/1
50000 CAPSULE ORAL
Qty: 12 CAP | Refills: 1 | Status: SHIPPED | OUTPATIENT
Start: 2020-10-19 | End: 2021-03-11 | Stop reason: SDUPTHER

## 2020-10-20 ENCOUNTER — PHARMACY VISIT (OUTPATIENT)
Dept: PHARMACY | Facility: MEDICAL CENTER | Age: 60
End: 2020-10-20
Payer: COMMERCIAL

## 2020-10-22 ENCOUNTER — OFFICE VISIT (OUTPATIENT)
Dept: MEDICAL GROUP | Facility: MEDICAL CENTER | Age: 60
End: 2020-10-22
Attending: NURSE PRACTITIONER
Payer: MEDICAID

## 2020-10-22 VITALS
BODY MASS INDEX: 35.43 KG/M2 | WEIGHT: 233.8 LBS | OXYGEN SATURATION: 92 % | RESPIRATION RATE: 16 BRPM | DIASTOLIC BLOOD PRESSURE: 68 MMHG | TEMPERATURE: 97.6 F | HEIGHT: 68 IN | SYSTOLIC BLOOD PRESSURE: 130 MMHG | HEART RATE: 95 BPM

## 2020-10-22 DIAGNOSIS — R80.9 MICROALBUMINURIA: ICD-10-CM

## 2020-10-22 DIAGNOSIS — Z59.41 FOOD INSECURITY: ICD-10-CM

## 2020-10-22 DIAGNOSIS — R06.02 SHORTNESS OF BREATH: ICD-10-CM

## 2020-10-22 DIAGNOSIS — E78.1 HYPERTRIGLYCERIDEMIA: ICD-10-CM

## 2020-10-22 DIAGNOSIS — Z23 NEED FOR VACCINATION: ICD-10-CM

## 2020-10-22 PROCEDURE — 99214 OFFICE O/P EST MOD 30 MIN: CPT | Performed by: NURSE PRACTITIONER

## 2020-10-22 PROCEDURE — RXMED WILLOW AMBULATORY MEDICATION CHARGE: Performed by: NURSE PRACTITIONER

## 2020-10-22 PROCEDURE — 90686 IIV4 VACC NO PRSV 0.5 ML IM: CPT

## 2020-10-22 PROCEDURE — 90686 IIV4 VACC NO PRSV 0.5 ML IM: CPT | Performed by: NURSE PRACTITIONER

## 2020-10-22 PROCEDURE — 99213 OFFICE O/P EST LOW 20 MIN: CPT | Mod: 25 | Performed by: NURSE PRACTITIONER

## 2020-10-22 RX ORDER — ATORVASTATIN CALCIUM 20 MG/1
20 TABLET, FILM COATED ORAL DAILY
Qty: 30 TAB | Refills: 0 | Status: SHIPPED | OUTPATIENT
Start: 2020-10-22 | End: 2021-01-12 | Stop reason: SDUPTHER

## 2020-10-22 RX ORDER — ALBUTEROL SULFATE 90 UG/1
1-2 AEROSOL, METERED RESPIRATORY (INHALATION) EVERY 4 HOURS PRN
Qty: 8.5 G | Refills: 11 | Status: SHIPPED | OUTPATIENT
Start: 2020-10-22 | End: 2022-01-13 | Stop reason: SDUPTHER

## 2020-10-22 RX ORDER — ICOSAPENT ETHYL 1000 MG/1
2 CAPSULE ORAL
Qty: 120 CAP | Refills: 3 | Status: SHIPPED | OUTPATIENT
Start: 2020-10-22 | End: 2021-02-02

## 2020-10-22 RX ORDER — LISINOPRIL 10 MG/1
10 TABLET ORAL DAILY
Qty: 30 TAB | Refills: 3 | Status: SHIPPED | OUTPATIENT
Start: 2020-10-22 | End: 2021-04-05 | Stop reason: SDUPTHER

## 2020-10-22 SDOH — ECONOMIC STABILITY - FOOD INSECURITY: FOOD INSECURITY: Z59.41

## 2020-10-22 ASSESSMENT — ENCOUNTER SYMPTOMS
PALPITATIONS: 0
COUGH: 0
DIARRHEA: 0
FEVER: 0
CHILLS: 0
SHORTNESS OF BREATH: 0
WEIGHT LOSS: 0
WHEEZING: 0
ABDOMINAL PAIN: 0
CONSTIPATION: 0
BLOOD IN STOOL: 0

## 2020-10-22 ASSESSMENT — PATIENT HEALTH QUESTIONNAIRE - PHQ9: CLINICAL INTERPRETATION OF PHQ2 SCORE: 0

## 2020-10-22 ASSESSMENT — FIBROSIS 4 INDEX: FIB4 SCORE: 0.45

## 2020-10-22 NOTE — ASSESSMENT & PLAN NOTE
I had previously started the patient on lisinopril 5 mg daily for an ACR of 32, his ACR is now 42.  Patient reports he has been taking his lisinopril regularly.  He denies dizziness.

## 2020-10-22 NOTE — PROGRESS NOTES
Chief Complaint   Patient presents with   • Lab Results   • Follow-Up   • Medication Refill       Subjective:     HPI:   Frederic You is a 60 y.o. male here to discuss the evaluation and management of:        Microalbuminuria  I had previously started the patient on lisinopril 5 mg daily for an ACR of 32, his ACR is now 42.  Patient reports he has been taking his lisinopril regularly.  He denies dizziness.    Need for vaccination  Patient presents today for his influenza vaccine    Food insecurity  Patient is food insecure and requesting food being services today.      ROS  Review of Systems   Constitutional: Negative for chills, fever, malaise/fatigue and weight loss.   Respiratory: Negative for cough, shortness of breath and wheezing.    Cardiovascular: Negative for chest pain, palpitations and leg swelling.   Gastrointestinal: Negative for abdominal pain, blood in stool, constipation and diarrhea.         No Known Allergies    Current medicines (including changes today)  Current Outpatient Medications   Medication Sig Dispense Refill   • lisinopril (PRINIVIL) 10 MG Tab Take 1 Tab by mouth every day. 30 Tab 3   • atorvastatin (LIPITOR) 20 MG Tab Take 1 Tab by mouth every day. 30 Tab 0   • Icosapent Ethyl 1 g Cap Take 2 g by mouth 2 times daily with meals as needed. 120 Cap 3   • albuterol 108 (90 Base) MCG/ACT Aero Soln inhalation aerosol Inhale 1-2 Puffs by mouth every four hours as needed. 8.5 g 11   • ergocalciferol (DRISDOL) 80501 UNIT capsule Take 1 Cap by mouth every 14 days. 12 Cap 1   • esomeprazole (NEXIUM 24HR) 20 MG capsule Take 1 Cap by mouth every morning before breakfast. 30 Cap 5   • cyclobenzaprine (FLEXERIL) 10 MG Tab        No current facility-administered medications for this visit.        Social History     Tobacco Use   • Smoking status: Current Every Day Smoker     Packs/day: 1.00     Years: 38.00     Pack years: 38.00     Types: Cigarettes   • Smokeless tobacco: Never Used  "  Substance Use Topics   • Alcohol use: Yes     Frequency: 2-3 times a week     Drinks per session: 1 or 2     Binge frequency: Monthly     Comment: one pint per day   • Drug use: No       Patient Active Problem List    Diagnosis Date Noted   • Need for vaccination 10/22/2020   • Food insecurity 10/22/2020   • Microalbuminuria 05/14/2020   • Vitamin D insufficiency 05/14/2020   • Encounter to establish care 12/02/2019   • Obesity (BMI 30-39.9) 12/02/2019   • Hypertriglyceridemia 12/02/2019   • Right leg swelling 12/02/2019   • Shortness of breath 12/02/2019   • GERD (gastroesophageal reflux disease) 09/10/2019   • Tobacco abuse 09/10/2019       Family History   Problem Relation Age of Onset   • Diabetes Mother    • Hypertension Mother    • Diabetes Father           Objective:     /68 (BP Location: Left arm, Patient Position: Sitting, BP Cuff Size: Adult)   Pulse 95   Temp 36.4 °C (97.6 °F) (Temporal)   Resp 16   Ht 1.727 m (5' 8\")   Wt 106.1 kg (233 lb 12.8 oz)   SpO2 92%  Body mass index is 35.55 kg/m².    Physical Exam:  Physical Exam   Constitutional: He is oriented to person, place, and time and well-developed, well-nourished, and in no distress. No distress.   HENT:   Head: Normocephalic.   Right Ear: Tympanic membrane and external ear normal.   Left Ear: Tympanic membrane and external ear normal.   Eyes: Pupils are equal, round, and reactive to light. Conjunctivae and EOM are normal.   Neck: Normal range of motion. Neck supple. No tracheal deviation present.   Cardiovascular: Normal rate, regular rhythm, normal heart sounds and intact distal pulses.   Pulmonary/Chest: Effort normal and breath sounds normal.   Abdominal: Soft. Bowel sounds are normal.   Musculoskeletal: Normal range of motion.   Lymphadenopathy:        Head (right side): No preauricular adenopathy present.        Head (left side): No preauricular adenopathy present.     He has no cervical adenopathy.   Neurological: He is alert and " oriented to person, place, and time. He has normal sensation, normal strength and intact cranial nerves. Gait normal.   Skin: Skin is warm and dry.   Psychiatric: Affect and judgment normal.     I have placed the below orders and discussed them with an approved delegating provider.  The MA is performing the below orders under the direction of Dr. Holloway.      Assessment and Plan:     The following treatment plan was discussed:    1. Microalbuminuria  lisinopril (PRINIVIL) 10 MG Tab  -Chronic problem, unstable.  We will increase his lisinopril dose to 10 mg daily.  His BP today was 130/68.  Potential side effects and discontinuation criteria discussed with patient, patient verbalized understanding.   2. Food insecurity   chronic problem, unstable.  Food bank services provided to patient today.   3. Hypertriglyceridemia  atorvastatin (LIPITOR) 20 MG Tab    Icosapent Ethyl 1 g Cap  -Chronic problem, improving.  Medication refill.   4. Shortness of breath  albuterol 108 (90 Base) MCG/ACT Aero Soln inhalation aerosol  -Chronic problem, stable.  Medication refill.   5. Need for vaccination  Influenza Vaccine Quad Injection (PF)       Any change or worsening of signs or symptoms, patient encouraged to follow-up or report to emergency room for further evaluation. Patient verbalizes understanding and agrees.    Follow-Up: Return in about 3 months (around 1/22/2021) for Routine follow up, cholesterol recheck.      PLEASE NOTE: This dictation was created using voice recognition software. I have made every reasonable attempt to correct obvious errors, but I expect that there are errors of grammar and possibly content that I did not discover before finalizing the note.

## 2020-10-23 ENCOUNTER — PHARMACY VISIT (OUTPATIENT)
Dept: PHARMACY | Facility: MEDICAL CENTER | Age: 60
End: 2020-10-23
Payer: COMMERCIAL

## 2020-12-01 ENCOUNTER — PHARMACY VISIT (OUTPATIENT)
Dept: PHARMACY | Facility: MEDICAL CENTER | Age: 60
End: 2020-12-01
Payer: COMMERCIAL

## 2020-12-01 PROCEDURE — RXMED WILLOW AMBULATORY MEDICATION CHARGE: Performed by: NURSE PRACTITIONER

## 2021-01-12 DIAGNOSIS — E78.1 HYPERTRIGLYCERIDEMIA: ICD-10-CM

## 2021-01-12 PROCEDURE — RXMED WILLOW AMBULATORY MEDICATION CHARGE: Performed by: NURSE PRACTITIONER

## 2021-01-12 RX ORDER — ATORVASTATIN CALCIUM 20 MG/1
20 TABLET, FILM COATED ORAL DAILY
Qty: 30 TAB | Refills: 0 | Status: CANCELLED | OUTPATIENT
Start: 2021-01-12

## 2021-01-13 ENCOUNTER — PHARMACY VISIT (OUTPATIENT)
Dept: PHARMACY | Facility: MEDICAL CENTER | Age: 61
End: 2021-01-13
Payer: COMMERCIAL

## 2021-01-13 PROCEDURE — RXMED WILLOW AMBULATORY MEDICATION CHARGE: Performed by: NURSE PRACTITIONER

## 2021-01-15 DIAGNOSIS — E78.1 HYPERTRIGLYCERIDEMIA: ICD-10-CM

## 2021-01-18 ENCOUNTER — PHARMACY VISIT (OUTPATIENT)
Dept: PHARMACY | Facility: MEDICAL CENTER | Age: 61
End: 2021-01-18
Payer: COMMERCIAL

## 2021-01-18 PROCEDURE — RXMED WILLOW AMBULATORY MEDICATION CHARGE: Performed by: NURSE PRACTITIONER

## 2021-01-18 RX ORDER — ATORVASTATIN CALCIUM 20 MG/1
20 TABLET, FILM COATED ORAL DAILY
Qty: 30 TAB | Refills: 3 | Status: SHIPPED | OUTPATIENT
Start: 2021-01-18 | End: 2021-03-11 | Stop reason: SDUPTHER

## 2021-02-02 DIAGNOSIS — E78.1 HYPERTRIGLYCERIDEMIA: ICD-10-CM

## 2021-02-02 RX ORDER — ICOSAPENT ETHYL 1000 MG/1
2 CAPSULE ORAL
Qty: 120 CAP | Refills: 11 | Status: SHIPPED | OUTPATIENT
Start: 2021-02-02 | End: 2021-04-14

## 2021-02-04 ENCOUNTER — TELEPHONE (OUTPATIENT)
Dept: MEDICAL GROUP | Facility: MEDICAL CENTER | Age: 61
End: 2021-02-04

## 2021-02-05 NOTE — TELEPHONE ENCOUNTER
FINAL PRIOR AUTHORIZATION STATUS:    1.  Name of Medication & Dose: VASEPA      2. Prior Auth Status: Denied.  Reason:      3. Action Taken: Pharmacy Notified: N\A Patient Notified: N\A

## 2021-02-24 PROCEDURE — RXMED WILLOW AMBULATORY MEDICATION CHARGE: Performed by: NURSE PRACTITIONER

## 2021-02-26 ENCOUNTER — PHARMACY VISIT (OUTPATIENT)
Dept: PHARMACY | Facility: MEDICAL CENTER | Age: 61
End: 2021-02-26
Payer: COMMERCIAL

## 2021-03-11 ENCOUNTER — OFFICE VISIT (OUTPATIENT)
Dept: MEDICAL GROUP | Facility: MEDICAL CENTER | Age: 61
End: 2021-03-11
Attending: NURSE PRACTITIONER
Payer: MEDICAID

## 2021-03-11 VITALS
HEART RATE: 100 BPM | SYSTOLIC BLOOD PRESSURE: 142 MMHG | HEIGHT: 68 IN | TEMPERATURE: 96.1 F | WEIGHT: 236.9 LBS | BODY MASS INDEX: 35.9 KG/M2 | RESPIRATION RATE: 16 BRPM | DIASTOLIC BLOOD PRESSURE: 85 MMHG | OXYGEN SATURATION: 94 %

## 2021-03-11 DIAGNOSIS — M25.561 ARTHRALGIA OF BOTH KNEES: ICD-10-CM

## 2021-03-11 DIAGNOSIS — Z59.41 FOOD INSECURITY: ICD-10-CM

## 2021-03-11 DIAGNOSIS — E55.9 VITAMIN D INSUFFICIENCY: ICD-10-CM

## 2021-03-11 DIAGNOSIS — M25.562 ARTHRALGIA OF BOTH KNEES: ICD-10-CM

## 2021-03-11 DIAGNOSIS — E78.1 HYPERTRIGLYCERIDEMIA: ICD-10-CM

## 2021-03-11 PROBLEM — M25.569 ARTHRALGIA OF KNEE: Status: ACTIVE | Noted: 2021-03-11

## 2021-03-11 PROCEDURE — 99213 OFFICE O/P EST LOW 20 MIN: CPT | Performed by: NURSE PRACTITIONER

## 2021-03-11 RX ORDER — NAPROXEN 500 MG/1
500 TABLET ORAL 2 TIMES DAILY WITH MEALS
Qty: 60 TABLET | Refills: 3 | Status: SHIPPED | OUTPATIENT
Start: 2021-03-11 | End: 2024-02-16

## 2021-03-11 RX ORDER — ATORVASTATIN CALCIUM 20 MG/1
20 TABLET, FILM COATED ORAL DAILY
Qty: 30 TABLET | Refills: 5 | Status: SHIPPED | OUTPATIENT
Start: 2021-03-11 | End: 2021-11-10 | Stop reason: SDUPTHER

## 2021-03-11 RX ORDER — ERGOCALCIFEROL 1.25 MG/1
50000 CAPSULE ORAL
Qty: 12 CAPSULE | Refills: 1 | Status: SHIPPED | OUTPATIENT
Start: 2021-03-11 | End: 2022-04-13 | Stop reason: SDUPTHER

## 2021-03-11 SDOH — ECONOMIC STABILITY - FOOD INSECURITY: FOOD INSECURITY: Z59.41

## 2021-03-11 ASSESSMENT — ENCOUNTER SYMPTOMS
ABDOMINAL PAIN: 0
COUGH: 0
SHORTNESS OF BREATH: 0
PALPITATIONS: 0
CONSTIPATION: 0
CHILLS: 0
WEIGHT LOSS: 0
DIARRHEA: 0
FEVER: 0
BLOOD IN STOOL: 0
WHEEZING: 0

## 2021-03-11 ASSESSMENT — FIBROSIS 4 INDEX: FIB4 SCORE: 0.45

## 2021-03-11 ASSESSMENT — PATIENT HEALTH QUESTIONNAIRE - PHQ9: CLINICAL INTERPRETATION OF PHQ2 SCORE: 0

## 2021-03-12 PROCEDURE — RXMED WILLOW AMBULATORY MEDICATION CHARGE: Performed by: NURSE PRACTITIONER

## 2021-03-12 NOTE — ASSESSMENT & PLAN NOTE
Present for many years.  He reports bilateral knee pain in the patellar region.  He reports he has a history of arthritis with bone spurs.  He reports that sometimes he uses naproxen with benefit, this is not often.  He reports activity is aggravating.  Rest and ice are alleviating.

## 2021-03-12 NOTE — PROGRESS NOTES
Chief Complaint   Patient presents with   • Follow-Up       Subjective:     HPI:   Frederic You is a 60 y.o. male here to discuss the evaluation and management of:        Food insecurity  Patient presents today requesting food bank assistance.    Arthralgia of both knees  Present for many years.  He reports bilateral knee pain in the patellar region.  He reports he has a history of arthritis with bone spurs.  He reports that sometimes he uses naproxen with benefit, this is not often.  He reports activity is aggravating.  Rest and ice are alleviating.      Medication Refill  -Patient presents today for refills of his ergocalciferol and atorvastatin.  He reports he is taking both of these without issue.      ROS  Review of Systems   Constitutional: Negative for chills, fever, malaise/fatigue and weight loss.   Respiratory: Negative for cough, shortness of breath and wheezing.    Cardiovascular: Negative for chest pain, palpitations and leg swelling.   Gastrointestinal: Negative for abdominal pain, blood in stool, constipation and diarrhea.         No Known Allergies    Current medicines (including changes today)  Current Outpatient Medications   Medication Sig Dispense Refill   • ergocalciferol (DRISDOL) 34618 UNIT capsule Take 1 Cap by mouth every 14 days. 12 capsule 1   • atorvastatin (LIPITOR) 20 MG Tab Take 1 Tab by mouth every day. 30 tablet 5   • naproxen (NAPROSYN) 500 MG Tab Take 1 tablet by mouth 2 times a day, with meals. 60 tablet 3   • lisinopril (PRINIVIL) 10 MG Tab Take 1 Tab by mouth every day. 30 Tab 3   • albuterol 108 (90 Base) MCG/ACT Aero Soln inhalation aerosol Inhale 1-2 Puffs by mouth every four hours as needed. 8.5 g 11   • esomeprazole (NEXIUM 24HR) 20 MG capsule Take 1 Cap by mouth every morning before breakfast. 30 Cap 5   • cyclobenzaprine (FLEXERIL) 10 MG Tab      • Icosapent Ethyl (VASCEPA) 1 g Cap Take 2 caps by mouth 2 (two) times a day. (Patient not taking: Reported on  "3/11/2021) 120 Cap 11     No current facility-administered medications for this visit.       Social History     Tobacco Use   • Smoking status: Current Every Day Smoker     Packs/day: 1.00     Years: 38.00     Pack years: 38.00     Types: Cigarettes   • Smokeless tobacco: Never Used   Substance Use Topics   • Alcohol use: Yes     Comment: one pint per day   • Drug use: No       Patient Active Problem List    Diagnosis Date Noted   • Arthralgia of both knees 03/11/2021   • Need for vaccination 10/22/2020   • Food insecurity 10/22/2020   • Microalbuminuria 05/14/2020   • Vitamin D insufficiency 05/14/2020   • Encounter to establish care 12/02/2019   • Obesity (BMI 30-39.9) 12/02/2019   • Hypertriglyceridemia 12/02/2019   • Right leg swelling 12/02/2019   • Shortness of breath 12/02/2019   • GERD (gastroesophageal reflux disease) 09/10/2019   • Tobacco abuse 09/10/2019       Family History   Problem Relation Age of Onset   • Diabetes Mother    • Hypertension Mother    • Diabetes Father           Objective:     /88 (BP Location: Left arm, Patient Position: Sitting, BP Cuff Size: Adult)   Pulse 100   Temp (!) 35.6 °C (96.1 °F) (Temporal)   Resp 16   Ht 1.727 m (5' 8\")   Wt 107 kg (236 lb 14.4 oz)   SpO2 94%  Body mass index is 36.02 kg/m².    Physical Exam:  Physical Exam   Constitutional: He is oriented to person, place, and time and well-developed, well-nourished, and in no distress. No distress.   HENT:   Head: Normocephalic.   Right Ear: Tympanic membrane and external ear normal.   Left Ear: Tympanic membrane and external ear normal.   Eyes: Pupils are equal, round, and reactive to light. Conjunctivae and EOM are normal.   Neck: No tracheal deviation present.   Cardiovascular: Normal rate, regular rhythm, normal heart sounds and intact distal pulses.   Pulmonary/Chest: Effort normal and breath sounds normal.   Abdominal: Soft. Bowel sounds are normal.   Musculoskeletal:         General: Normal range of " motion.      Cervical back: Normal range of motion and neck supple.   Lymphadenopathy:        Head (right side): No preauricular adenopathy present.        Head (left side): No preauricular adenopathy present.     He has no cervical adenopathy.   Neurological: He is alert and oriented to person, place, and time. He has normal sensation, normal strength and intact cranial nerves. Gait normal.   Skin: Skin is warm and dry.   Psychiatric: Affect and judgment normal.       Assessment and Plan:     The following treatment plan was discussed:    1. Arthralgia of both knees  naproxen (NAPROSYN) 500 MG Tab  -Chronic problem, stable.  Medication refill   2. Vitamin D insufficiency  ergocalciferol (DRISDOL) 27722 UNIT capsule  -Chronic problem, stable.  Medication refill   3. Hypertriglyceridemia  atorvastatin (LIPITOR) 20 MG Tab.  -Chronic problem, stable.  Medication refill.     4. Food insecurity   chronic problem, unstable.  Food bank food provided to patient along with a 1 year prescription for food bank services.       Any change or worsening of signs or symptoms, patient encouraged to follow-up or report to emergency room for further evaluation. Patient verbalizes understanding and agrees.    Follow-Up: Return in about 4 weeks (around 4/8/2021) for Hypertension.      PLEASE NOTE: This dictation was created using voice recognition software. I have made every reasonable attempt to correct obvious errors, but I expect that there are errors of grammar and possibly content that I did not discover before finalizing the note.

## 2021-03-15 DIAGNOSIS — Z23 NEED FOR VACCINATION: ICD-10-CM

## 2021-03-23 PROCEDURE — RXMED WILLOW AMBULATORY MEDICATION CHARGE: Performed by: NURSE PRACTITIONER

## 2021-03-24 PROCEDURE — RXMED WILLOW AMBULATORY MEDICATION CHARGE: Performed by: NURSE PRACTITIONER

## 2021-04-05 DIAGNOSIS — R80.9 MICROALBUMINURIA: ICD-10-CM

## 2021-04-05 PROCEDURE — RXMED WILLOW AMBULATORY MEDICATION CHARGE: Performed by: PHYSICIAN ASSISTANT

## 2021-04-05 PROCEDURE — RXMED WILLOW AMBULATORY MEDICATION CHARGE: Performed by: NURSE PRACTITIONER

## 2021-04-05 RX ORDER — LISINOPRIL 10 MG/1
10 TABLET ORAL DAILY
Qty: 30 TABLET | Refills: 0 | Status: SHIPPED | OUTPATIENT
Start: 2021-04-05 | End: 2021-05-04 | Stop reason: SDUPTHER

## 2021-04-06 ENCOUNTER — PHARMACY VISIT (OUTPATIENT)
Dept: PHARMACY | Facility: MEDICAL CENTER | Age: 61
End: 2021-04-06
Payer: COMMERCIAL

## 2021-04-14 ENCOUNTER — OFFICE VISIT (OUTPATIENT)
Dept: MEDICAL GROUP | Facility: MEDICAL CENTER | Age: 61
End: 2021-04-14
Attending: NURSE PRACTITIONER
Payer: MEDICAID

## 2021-04-14 VITALS
WEIGHT: 247 LBS | TEMPERATURE: 96.7 F | OXYGEN SATURATION: 96 % | SYSTOLIC BLOOD PRESSURE: 124 MMHG | HEIGHT: 68 IN | RESPIRATION RATE: 16 BRPM | HEART RATE: 95 BPM | DIASTOLIC BLOOD PRESSURE: 78 MMHG | BODY MASS INDEX: 37.44 KG/M2

## 2021-04-14 DIAGNOSIS — I25.10 CORONARY ATHEROSCLEROSIS DUE TO LIPID RICH PLAQUE: ICD-10-CM

## 2021-04-14 DIAGNOSIS — I10 ESSENTIAL HYPERTENSION: ICD-10-CM

## 2021-04-14 DIAGNOSIS — I25.83 CORONARY ATHEROSCLEROSIS DUE TO LIPID RICH PLAQUE: ICD-10-CM

## 2021-04-14 PROCEDURE — 99213 OFFICE O/P EST LOW 20 MIN: CPT | Performed by: NURSE PRACTITIONER

## 2021-04-14 RX ORDER — ICOSAPENT ETHYL 1000 MG/1
2 CAPSULE ORAL 2 TIMES DAILY
Qty: 120 CAPSULE | Refills: 5 | Status: SHIPPED | OUTPATIENT
Start: 2021-04-14 | End: 2024-02-16

## 2021-04-14 ASSESSMENT — ENCOUNTER SYMPTOMS
COUGH: 0
WHEEZING: 0
ABDOMINAL PAIN: 0
PALPITATIONS: 0
DIARRHEA: 0
WEIGHT LOSS: 0
FEVER: 0
SHORTNESS OF BREATH: 0
BLOOD IN STOOL: 0
CHILLS: 0
CONSTIPATION: 0

## 2021-04-14 ASSESSMENT — FIBROSIS 4 INDEX: FIB4 SCORE: 0.45

## 2021-04-14 NOTE — ASSESSMENT & PLAN NOTE
BP at last visit was 142/85- today is 124/74.  Denies CP.  He continues to take his lisinopril 10 mg.  The patient walks for a visit and on her last visit he had rushed to get here.  He got to his visit earlier today so he could relax for at least 10 minutes prior to his BP reading.

## 2021-04-14 NOTE — PROGRESS NOTES
Chief Complaint   Patient presents with   • Follow-Up     Blood Pressure       Subjective:     HPI:   Frederic You is a 60 y.o. male here to discuss the evaluation and management of:        Essential hypertension  BP at last visit was 142/85- today is 124/74.  Denies CP.  He continues to take his lisinopril 10 mg.  The patient walks for a visit and on her last visit he had rushed to get here.  He got to his visit earlier today so he could relax for at least 10 minutes prior to his BP reading.      ROS  Review of Systems   Constitutional: Negative for chills, fever, malaise/fatigue and weight loss.   Respiratory: Negative for cough, shortness of breath and wheezing.    Cardiovascular: Negative for chest pain, palpitations and leg swelling.   Gastrointestinal: Negative for abdominal pain, blood in stool, constipation and diarrhea.         No Known Allergies    Current medicines (including changes today)  Current Outpatient Medications   Medication Sig Dispense Refill   • lisinopril (PRINIVIL) 10 MG Tab Take 1 tablet by mouth every day. 30 tablet 0   • ergocalciferol (DRISDOL) 93055 UNIT capsule Take 1 Cap by mouth every 14 days. 12 capsule 1   • atorvastatin (LIPITOR) 20 MG Tab Take 1 Tab by mouth every day. 30 tablet 5   • naproxen (NAPROSYN) 500 MG Tab Take 1 tablet by mouth 2 times a day, with meals. 60 tablet 3   • albuterol 108 (90 Base) MCG/ACT Aero Soln inhalation aerosol Inhale 1-2 Puffs by mouth every four hours as needed. 8.5 g 11   • esomeprazole (NEXIUM 24HR) 20 MG capsule Take 1 Cap by mouth every morning before breakfast. 30 Cap 5   • cyclobenzaprine (FLEXERIL) 10 MG Tab      • Icosapent Ethyl (VASCEPA) 1 g Cap Take 2 caps by mouth 2 (two) times a day. (Patient not taking: Reported on 3/11/2021) 120 Cap 11     No current facility-administered medications for this visit.       Social History     Tobacco Use   • Smoking status: Current Every Day Smoker     Packs/day: 1.00     Years: 38.00      "Pack years: 38.00     Types: Cigarettes   • Smokeless tobacco: Never Used   Substance Use Topics   • Alcohol use: Yes     Comment: one pint per day   • Drug use: No       Patient Active Problem List    Diagnosis Date Noted   • Essential hypertension 04/14/2021   • Arthralgia of both knees 03/11/2021   • Need for vaccination 10/22/2020   • Food insecurity 10/22/2020   • Microalbuminuria 05/14/2020   • Vitamin D insufficiency 05/14/2020   • Encounter to establish care 12/02/2019   • Obesity (BMI 30-39.9) 12/02/2019   • Hypertriglyceridemia 12/02/2019   • Right leg swelling 12/02/2019   • Shortness of breath 12/02/2019   • GERD (gastroesophageal reflux disease) 09/10/2019   • Tobacco abuse 09/10/2019       Family History   Problem Relation Age of Onset   • Diabetes Mother    • Hypertension Mother    • Diabetes Father           Objective:     /78 (BP Location: Left arm, Patient Position: Sitting, BP Cuff Size: Adult)   Pulse 95   Temp 35.9 °C (96.7 °F) (Temporal)   Resp 16   Ht 1.727 m (5' 8\")   Wt 112 kg (247 lb)   SpO2 96%  Body mass index is 37.56 kg/m².    Physical Exam:  Physical Exam   Constitutional: He is oriented to person, place, and time and well-developed, well-nourished, and in no distress. No distress.   HENT:   Head: Normocephalic.   Right Ear: Tympanic membrane and external ear normal.   Left Ear: Tympanic membrane and external ear normal.   Eyes: Pupils are equal, round, and reactive to light. Conjunctivae and EOM are normal.   Neck: No tracheal deviation present.   Cardiovascular: Normal rate, regular rhythm, normal heart sounds and intact distal pulses.   Pulmonary/Chest: Effort normal and breath sounds normal.   Abdominal: Soft. Bowel sounds are normal.   Musculoskeletal:         General: Normal range of motion.      Cervical back: Normal range of motion and neck supple.   Lymphadenopathy:        Head (right side): No preauricular adenopathy present.        Head (left side): No " preauricular adenopathy present.     He has no cervical adenopathy.   Neurological: He is alert and oriented to person, place, and time. He has normal sensation, normal strength and intact cranial nerves. Gait normal.   Skin: Skin is warm and dry.   Psychiatric: Affect and judgment normal.       Assessment and Plan:     The following treatment plan was discussed:    1. Essential hypertension  -chronic problem, stable.  Continue lisinopril 10 mg daily.  Continue exercising.       Any change or worsening of signs or symptoms, patient encouraged to follow-up or report to emergency room for further evaluation. Patient verbalizes understanding and agrees.    Follow-Up: Return in about 3 months (around 7/14/2021) for Routine follow up.      PLEASE NOTE: This dictation was created using voice recognition software. I have made every reasonable attempt to correct obvious errors, but I expect that there are errors of grammar and possibly content that I did not discover before finalizing the note.

## 2021-04-20 ENCOUNTER — TELEPHONE (OUTPATIENT)
Dept: MEDICAL GROUP | Facility: MEDICAL CENTER | Age: 61
End: 2021-04-20

## 2021-04-21 NOTE — TELEPHONE ENCOUNTER
FINAL PRIOR AUTHORIZATION STATUS:    1.  Name of Medication & Dose: Vascepa 1 gm cap     2. Prior Auth Status: Denied.  Reason: Please see scanned document for denial reasons.    3. Action Taken: Pharmacy Notified: N\A Patient Notified: N\A

## 2021-05-04 DIAGNOSIS — R80.9 MICROALBUMINURIA: ICD-10-CM

## 2021-05-04 PROCEDURE — RXMED WILLOW AMBULATORY MEDICATION CHARGE: Performed by: NURSE PRACTITIONER

## 2021-05-05 PROCEDURE — RXMED WILLOW AMBULATORY MEDICATION CHARGE: Performed by: NURSE PRACTITIONER

## 2021-05-05 RX ORDER — LISINOPRIL 10 MG/1
10 TABLET ORAL DAILY
Qty: 30 TABLET | Refills: 5 | Status: SHIPPED | OUTPATIENT
Start: 2021-05-05 | End: 2021-12-07 | Stop reason: SDUPTHER

## 2021-05-06 ENCOUNTER — PHARMACY VISIT (OUTPATIENT)
Dept: PHARMACY | Facility: MEDICAL CENTER | Age: 61
End: 2021-05-06
Payer: COMMERCIAL

## 2021-06-15 PROCEDURE — RXMED WILLOW AMBULATORY MEDICATION CHARGE: Performed by: NURSE PRACTITIONER

## 2021-06-17 ENCOUNTER — PHARMACY VISIT (OUTPATIENT)
Dept: PHARMACY | Facility: MEDICAL CENTER | Age: 61
End: 2021-06-17
Payer: COMMERCIAL

## 2021-06-21 ENCOUNTER — TELEPHONE (OUTPATIENT)
Dept: MEDICAL GROUP | Facility: MEDICAL CENTER | Age: 61
End: 2021-06-21

## 2021-06-21 NOTE — TELEPHONE ENCOUNTER
DOCUMENTATION OF PAR STATUS:    1. Name of Medication & Dose: Vascepa 1Gm cap    2. Name of Prescription Coverage Company & phone #:  [employer]     3. Date Prior Auth Submitted: 6/21/2021    4. What information was given to obtain insurance decision? ICD 10 Code    5. Prior Auth Status? Pending    6. Patient Notified: N\A

## 2021-06-22 ENCOUNTER — TELEPHONE (OUTPATIENT)
Dept: MEDICAL GROUP | Facility: MEDICAL CENTER | Age: 61
End: 2021-06-22

## 2021-06-22 NOTE — TELEPHONE ENCOUNTER
FINAL PRIOR AUTHORIZATION STATUS:    1.  Name of Medication & Dose: Icosapent Ethyl 1 G Capsule     2. Prior Auth Status: Denied.  Reason: Please see scanned document for denial reasons    3. Action Taken: Pharmacy Notified: yes Patient Notified: yes

## 2021-06-29 ENCOUNTER — TELEPHONE (OUTPATIENT)
Dept: MEDICAL GROUP | Facility: MEDICAL CENTER | Age: 61
End: 2021-06-29

## 2021-06-29 NOTE — TELEPHONE ENCOUNTER
FINAL PRIOR AUTHORIZATION STATUS:    1.  Name of Medication & Dose: Icosapent Ethyl 1 G     2. Prior Auth Status: Denied.  Reason: Please see scanned document for denial reasons    3. Action Taken: Pharmacy Notified: yes Patient Notified: yes

## 2021-06-29 NOTE — TELEPHONE ENCOUNTER
Duplicate:    6/29/21 11:07 AM  Note     FINAL PRIOR AUTHORIZATION STATUS:     1.  Name of Medication & Dose: Icosapent Ethyl 1 G      2. Prior Auth Status: Denied.  Reason: Please see scanned document for denial reasons     3. Action Taken: Pharmacy Notified: yes Patient Notified: yes

## 2021-07-27 DIAGNOSIS — K21.9 GERD (GASTROESOPHAGEAL REFLUX DISEASE): ICD-10-CM

## 2021-07-27 PROCEDURE — RXMED WILLOW AMBULATORY MEDICATION CHARGE: Performed by: NURSE PRACTITIONER

## 2021-07-29 ENCOUNTER — PHARMACY VISIT (OUTPATIENT)
Dept: PHARMACY | Facility: MEDICAL CENTER | Age: 61
End: 2021-07-29
Payer: COMMERCIAL

## 2021-08-25 ENCOUNTER — OFFICE VISIT (OUTPATIENT)
Dept: MEDICAL GROUP | Facility: MEDICAL CENTER | Age: 61
End: 2021-08-25
Attending: NURSE PRACTITIONER
Payer: MEDICAID

## 2021-08-25 ENCOUNTER — PHARMACY VISIT (OUTPATIENT)
Dept: PHARMACY | Facility: MEDICAL CENTER | Age: 61
End: 2021-08-25
Payer: COMMERCIAL

## 2021-08-25 VITALS
TEMPERATURE: 97.3 F | WEIGHT: 241 LBS | RESPIRATION RATE: 16 BRPM | HEART RATE: 104 BPM | HEIGHT: 68 IN | OXYGEN SATURATION: 93 % | DIASTOLIC BLOOD PRESSURE: 80 MMHG | SYSTOLIC BLOOD PRESSURE: 126 MMHG | BODY MASS INDEX: 36.53 KG/M2

## 2021-08-25 DIAGNOSIS — M25.472 ANKLE EDEMA, BILATERAL: ICD-10-CM

## 2021-08-25 DIAGNOSIS — B34.9 VIRAL ILLNESS: ICD-10-CM

## 2021-08-25 DIAGNOSIS — M25.471 ANKLE EDEMA, BILATERAL: ICD-10-CM

## 2021-08-25 DIAGNOSIS — Z13.6 SCREENING FOR CARDIOVASCULAR CONDITION: ICD-10-CM

## 2021-08-25 DIAGNOSIS — Z13.228 SCREENING FOR METABOLIC DISORDER: ICD-10-CM

## 2021-08-25 DIAGNOSIS — R20.2 TINGLING OF BOTH FEET: ICD-10-CM

## 2021-08-25 PROCEDURE — 99214 OFFICE O/P EST MOD 30 MIN: CPT | Performed by: NURSE PRACTITIONER

## 2021-08-25 PROCEDURE — RXMED WILLOW AMBULATORY MEDICATION CHARGE: Performed by: NURSE PRACTITIONER

## 2021-08-25 ASSESSMENT — ENCOUNTER SYMPTOMS
COUGH: 0
WHEEZING: 0
CONSTIPATION: 0
CHILLS: 0
FEVER: 0
DIARRHEA: 0
WEIGHT LOSS: 0
SHORTNESS OF BREATH: 0
PALPITATIONS: 0
TINGLING: 1
ABDOMINAL PAIN: 0
CLAUDICATION: 1
BLOOD IN STOOL: 0
SENSORY CHANGE: 1

## 2021-08-25 ASSESSMENT — FIBROSIS 4 INDEX: FIB4 SCORE: 0.45

## 2021-08-25 NOTE — ASSESSMENT & PLAN NOTE
Pt reports that he has numbness and tingling in both feet from his toes to mid foot.  It radiated to his heel on bad days.  Walking makes it worse.  He reports his toes are cold.  He is interested in testing.  He reports claudication.  He denies weakness in his LE.

## 2021-08-25 NOTE — PROGRESS NOTES
Chief Complaint   Patient presents with   • Hypertension       Subjective:     HPI:   Frederic You is a 60 y.o. male here to discuss the evaluation and management of:        Ankle edema, bilateral  He reports that his ankles got swollen over the 4th of July weekend.   He was out drinking and walking around a lot.  The swelling resolved within a few days.  He denies CP.       Tingling of both feet  Pt reports that he has numbness and tingling in both feet from his toes to mid foot.  It radiated to his heel on bad days.  Walking makes it worse.  He reports his toes are cold.  He is interested in testing.  He reports claudication.  He denies weakness in his LE.        ROS  Review of Systems   Constitutional: Negative for chills, fever, malaise/fatigue and weight loss.   Respiratory: Negative for cough, shortness of breath and wheezing.    Cardiovascular: Positive for claudication. Negative for chest pain, palpitations and leg swelling.   Gastrointestinal: Negative for abdominal pain, blood in stool, constipation and diarrhea.   Neurological: Positive for tingling and sensory change.         No Known Allergies    Current medicines (including changes today)  Current Outpatient Medications   Medication Sig Dispense Refill   • esomeprazole (NEXIUM 24HR) 20 MG capsule Take 1 capsule by mouth every morning before breakfast. 30 capsule 5   • lisinopril (PRINIVIL) 10 MG Tab Take 1 tablet by mouth every day. 30 tablet 5   • Icosapent Ethyl (VASCEPA) 1 g Cap Take 2 capsules by mouth 2 times a day. 120 capsule 5   • ergocalciferol (DRISDOL) 94236 UNIT capsule Take 1 Cap by mouth every 14 days. 12 capsule 1   • atorvastatin (LIPITOR) 20 MG Tab Take 1 Tab by mouth every day. 30 tablet 5   • naproxen (NAPROSYN) 500 MG Tab Take 1 tablet by mouth 2 times a day, with meals. 60 tablet 3   • albuterol 108 (90 Base) MCG/ACT Aero Soln inhalation aerosol Inhale 1-2 Puffs by mouth every four hours as needed. 8.5 g 11   •  "cyclobenzaprine (FLEXERIL) 10 MG Tab        No current facility-administered medications for this visit.       Social History     Tobacco Use   • Smoking status: Current Every Day Smoker     Packs/day: 1.00     Years: 38.00     Pack years: 38.00     Types: Cigarettes   • Smokeless tobacco: Never Used   Vaping Use   • Vaping Use: Never used   Substance Use Topics   • Alcohol use: Yes     Comment: one pint per day   • Drug use: No       Patient Active Problem List    Diagnosis Date Noted   • Ankle edema, bilateral 08/25/2021   • Tingling of both feet 08/25/2021   • Essential hypertension 04/14/2021   • Arthralgia of both knees 03/11/2021   • Need for vaccination 10/22/2020   • Food insecurity 10/22/2020   • Microalbuminuria 05/14/2020   • Vitamin D insufficiency 05/14/2020   • Encounter to establish care 12/02/2019   • Obesity (BMI 30-39.9) 12/02/2019   • Hypertriglyceridemia 12/02/2019   • Right leg swelling 12/02/2019   • Shortness of breath 12/02/2019   • GERD (gastroesophageal reflux disease) 09/10/2019   • Tobacco abuse 09/10/2019       Family History   Problem Relation Age of Onset   • Diabetes Mother    • Hypertension Mother    • Diabetes Father           Objective:     /80   Pulse (!) 104   Temp 36.3 °C (97.3 °F) (Temporal)   Resp 16   Ht 1.727 m (5' 7.99\")   Wt 109 kg (241 lb)   SpO2 93%  Body mass index is 36.65 kg/m².    Physical Exam:  Physical Exam  Constitutional:       General: He is not in acute distress.  HENT:      Head: Normocephalic.      Right Ear: Tympanic membrane and external ear normal.      Left Ear: Tympanic membrane and external ear normal.   Eyes:      Conjunctiva/sclera: Conjunctivae normal.      Pupils: Pupils are equal, round, and reactive to light.   Neck:      Trachea: No tracheal deviation.   Cardiovascular:      Rate and Rhythm: Normal rate and regular rhythm.      Heart sounds: Normal heart sounds.      Comments: Bilateral varicose veins present.  Pulmonary:      " Effort: Pulmonary effort is normal.      Breath sounds: Normal breath sounds.   Abdominal:      General: Bowel sounds are normal.      Palpations: Abdomen is soft.   Musculoskeletal:         General: Normal range of motion.      Cervical back: Normal range of motion and neck supple.      Right lower leg: No edema.      Left lower leg: No edema.   Lymphadenopathy:      Head:      Right side of head: No preauricular adenopathy.      Left side of head: No preauricular adenopathy.      Cervical: No cervical adenopathy.   Skin:     General: Skin is warm and dry.   Neurological:      Mental Status: He is alert and oriented to person, place, and time.      Cranial Nerves: Cranial nerves are intact.      Sensory: Sensation is intact.      Gait: Gait is intact.   Psychiatric:         Mood and Affect: Affect normal.         Judgment: Judgment normal.         Assessment and Plan:     The following treatment plan was discussed:    1. Tingling of both feet  REFERRAL TO NEUROLOGY    REFERRAL TO VASCULAR SURGERY  -Chronic problem, unstable.  Considering the patient does have a history of dyslipidemia and hypertension, well presenting with claudication symptoms I have referred him to vascular surgery.  I have also referred him to neurology as the patient is not interested in medications for neuropathy but just like testing to confirm the diagnosis at this point.  ER precautions reviewed.   2. Ankle edema, bilateral  -new problem, resolved.  Return if this reoccurs.   3. Viral illness  SARS CoV-2 Ab, Total  -Chronic problem, unstable.  Patient would like testing to see if he has antibodies to Covid as he thinks he got several months ago.   4. Screening for metabolic disorder  HEMOGLOBIN A1C    MICROALBUMIN CREAT RATIO URINE   5. Screening for cardiovascular condition  Lipid Profile       Any change or worsening of signs or symptoms, patient encouraged to follow-up or report to emergency room for further evaluation. Patient  verbalizes understanding and agrees.    Follow-Up: Return for TBD by results or sooner as needed.      PLEASE NOTE: This dictation was created using voice recognition software. I have made every reasonable attempt to correct obvious errors, but I expect that there are errors of grammar and possibly content that I did not discover before finalizing the note.

## 2021-08-25 NOTE — ASSESSMENT & PLAN NOTE
He reports that his ankles got swollen over the 4th of July weekend.   He was out drinking and walking around a lot.  The swelling resolved within a few days.  He denies CP.

## 2021-09-08 NOTE — TELEPHONE ENCOUNTER
----- Message from DYLON Valentine sent at 3/17/2020  2:36 PM PDT -----  Please call pt and let them know I got his lab results.  I would like him to schedule an appointment with me so that we can discuss his triglyceride levels.  His triglyceride level was 407, I am sure much improved from before but we do have some new treatment options at like to talk to him about.  His blood sugar is elevated placing him in the prediabetes category.  He can improve this by increasing physical activity and decreasing carbohydrates in his diet like bread, pasta, rice, candy, cookies, soda…  He is negative for hepatitis C.  His thyroid function is normal.  His vitamin D is slightly low, I would like to see him take an over-the-counter vitamin D3 supplement, 2000 units daily.  If the patient is uncomfortable coming in for a visit, we can plan a time to talk on the phone.  Thank you  
Phone Number Called: [phone] (home)       Call outcome: Did not leave a detailed message. Requested patient to call back.    Message: Please call pt and let them know I got his lab results.  I would like him to schedule an appointment with me so that we can discuss his triglyceride levels.  His triglyceride level was 407, I am sure much improved from before but we do have some new treatment options at like to talk to him about.  His blood sugar is elevated placing him in the prediabetes category.  He can improve this by increasing physical activity and decreasing carbohydrates in his diet like bread, pasta, rice, candy, cookies, soda…   He is negative for hepatitis C.  His thyroid function is normal.  His vitamin D is slightly low, I would like to see him take an over-the-counter vitamin D3 supplement, 2000 units daily.  If the patient is uncomfortable coming in for a visit, we can plan a time to talk on the phone.  Thank you     
PAST MEDICAL HISTORY:  Atrial fibrillation, unspecified type

## 2021-10-05 PROCEDURE — RXMED WILLOW AMBULATORY MEDICATION CHARGE: Performed by: NURSE PRACTITIONER

## 2021-10-06 ENCOUNTER — PHARMACY VISIT (OUTPATIENT)
Dept: PHARMACY | Facility: MEDICAL CENTER | Age: 61
End: 2021-10-06
Payer: COMMERCIAL

## 2021-11-10 DIAGNOSIS — E78.1 HYPERTRIGLYCERIDEMIA: ICD-10-CM

## 2021-11-10 PROCEDURE — RXMED WILLOW AMBULATORY MEDICATION CHARGE: Performed by: NURSE PRACTITIONER

## 2021-11-11 PROCEDURE — RXMED WILLOW AMBULATORY MEDICATION CHARGE: Performed by: NURSE PRACTITIONER

## 2021-11-11 RX ORDER — ATORVASTATIN CALCIUM 20 MG/1
20 TABLET, FILM COATED ORAL DAILY
Qty: 30 TABLET | Refills: 5 | Status: SHIPPED | OUTPATIENT
Start: 2021-11-11 | End: 2022-07-29 | Stop reason: SDUPTHER

## 2021-11-12 ENCOUNTER — PHARMACY VISIT (OUTPATIENT)
Dept: PHARMACY | Facility: MEDICAL CENTER | Age: 61
End: 2021-11-12
Payer: COMMERCIAL

## 2021-12-07 DIAGNOSIS — R80.9 MICROALBUMINURIA: ICD-10-CM

## 2021-12-07 PROCEDURE — RXMED WILLOW AMBULATORY MEDICATION CHARGE: Performed by: NURSE PRACTITIONER

## 2021-12-08 PROCEDURE — RXMED WILLOW AMBULATORY MEDICATION CHARGE: Performed by: NURSE PRACTITIONER

## 2021-12-08 RX ORDER — LISINOPRIL 10 MG/1
10 TABLET ORAL DAILY
Qty: 30 TABLET | Refills: 5 | Status: SHIPPED | OUTPATIENT
Start: 2021-12-08 | End: 2022-09-11 | Stop reason: SDUPTHER

## 2021-12-20 ENCOUNTER — PHARMACY VISIT (OUTPATIENT)
Dept: PHARMACY | Facility: MEDICAL CENTER | Age: 61
End: 2021-12-20
Payer: COMMERCIAL

## 2022-01-13 DIAGNOSIS — R06.02 SHORTNESS OF BREATH: ICD-10-CM

## 2022-01-17 RX ORDER — ALBUTEROL SULFATE 90 UG/1
1-2 AEROSOL, METERED RESPIRATORY (INHALATION) EVERY 4 HOURS PRN
Qty: 8.5 G | Refills: 11 | Status: SHIPPED | OUTPATIENT
Start: 2022-01-17 | End: 2024-02-16

## 2022-01-28 PROCEDURE — RXMED WILLOW AMBULATORY MEDICATION CHARGE: Performed by: NURSE PRACTITIONER

## 2022-02-04 ENCOUNTER — PHARMACY VISIT (OUTPATIENT)
Dept: PHARMACY | Facility: MEDICAL CENTER | Age: 62
End: 2022-02-04
Payer: MEDICARE

## 2022-03-09 DIAGNOSIS — K21.9 GERD (GASTROESOPHAGEAL REFLUX DISEASE): ICD-10-CM

## 2022-03-09 PROCEDURE — RXMED WILLOW AMBULATORY MEDICATION CHARGE: Performed by: NURSE PRACTITIONER

## 2022-03-14 ENCOUNTER — PHARMACY VISIT (OUTPATIENT)
Dept: PHARMACY | Facility: MEDICAL CENTER | Age: 62
End: 2022-03-14
Payer: MEDICARE

## 2022-04-13 DIAGNOSIS — E55.9 VITAMIN D INSUFFICIENCY: ICD-10-CM

## 2022-04-13 PROCEDURE — RXMED WILLOW AMBULATORY MEDICATION CHARGE: Performed by: NURSE PRACTITIONER

## 2022-04-13 RX ORDER — ERGOCALCIFEROL 1.25 MG/1
50000 CAPSULE ORAL
Qty: 12 CAPSULE | Refills: 1 | Status: SHIPPED | OUTPATIENT
Start: 2022-04-13 | End: 2024-02-16

## 2022-04-13 NOTE — TELEPHONE ENCOUNTER
Received request via: Pharmacy    Was the patient seen in the last year in this department? Yes    Does the patient have an active prescription (recently filled or refills available) for medication(s) requested? No     Last visit 8/25/21

## 2022-04-27 ENCOUNTER — PHARMACY VISIT (OUTPATIENT)
Dept: PHARMACY | Facility: MEDICAL CENTER | Age: 62
End: 2022-04-27
Payer: MEDICARE

## 2022-05-31 PROCEDURE — RXMED WILLOW AMBULATORY MEDICATION CHARGE: Performed by: NURSE PRACTITIONER

## 2022-06-10 ENCOUNTER — PHARMACY VISIT (OUTPATIENT)
Dept: PHARMACY | Facility: MEDICAL CENTER | Age: 62
End: 2022-06-10
Payer: MEDICARE

## 2022-07-29 DIAGNOSIS — E78.1 HYPERTRIGLYCERIDEMIA: ICD-10-CM

## 2022-07-29 PROCEDURE — RXMED WILLOW AMBULATORY MEDICATION CHARGE: Performed by: NURSE PRACTITIONER

## 2022-08-01 RX ORDER — ATORVASTATIN CALCIUM 20 MG/1
20 TABLET, FILM COATED ORAL DAILY
Qty: 30 TABLET | Refills: 5 | Status: SHIPPED | OUTPATIENT
Start: 2022-08-01 | End: 2024-02-16

## 2022-08-12 ENCOUNTER — PHARMACY VISIT (OUTPATIENT)
Dept: PHARMACY | Facility: MEDICAL CENTER | Age: 62
End: 2022-08-12
Payer: MEDICARE

## 2022-09-08 ENCOUNTER — TELEPHONE (OUTPATIENT)
Dept: SCHEDULING | Facility: IMAGING CENTER | Age: 62
End: 2022-09-08

## 2022-09-11 DIAGNOSIS — R80.9 MICROALBUMINURIA: ICD-10-CM

## 2022-09-12 ENCOUNTER — APPOINTMENT (OUTPATIENT)
Dept: MEDICAL GROUP | Facility: MEDICAL CENTER | Age: 62
End: 2022-09-12
Payer: COMMERCIAL

## 2022-09-12 PROCEDURE — RXMED WILLOW AMBULATORY MEDICATION CHARGE: Performed by: NURSE PRACTITIONER

## 2022-09-15 PROCEDURE — RXMED WILLOW AMBULATORY MEDICATION CHARGE: Performed by: NURSE PRACTITIONER

## 2022-09-15 RX ORDER — LISINOPRIL 10 MG/1
10 TABLET ORAL DAILY
Qty: 30 TABLET | Refills: 5 | Status: SHIPPED | OUTPATIENT
Start: 2022-09-15 | End: 2024-02-16

## 2022-09-21 ENCOUNTER — PHARMACY VISIT (OUTPATIENT)
Dept: PHARMACY | Facility: MEDICAL CENTER | Age: 62
End: 2022-09-21
Payer: MEDICARE

## 2022-11-15 PROCEDURE — RXMED WILLOW AMBULATORY MEDICATION CHARGE: Performed by: NURSE PRACTITIONER

## 2022-11-23 ENCOUNTER — PHARMACY VISIT (OUTPATIENT)
Dept: PHARMACY | Facility: MEDICAL CENTER | Age: 62
End: 2022-11-23
Payer: MEDICARE

## 2022-12-22 DIAGNOSIS — M25.562 ARTHRALGIA OF BOTH KNEES: ICD-10-CM

## 2022-12-22 DIAGNOSIS — M25.561 ARTHRALGIA OF BOTH KNEES: ICD-10-CM

## 2022-12-22 PROCEDURE — RXMED WILLOW AMBULATORY MEDICATION CHARGE: Performed by: NURSE PRACTITIONER

## 2022-12-22 RX ORDER — NAPROXEN 500 MG/1
500 TABLET ORAL 2 TIMES DAILY WITH MEALS
Qty: 60 TABLET | Refills: 3 | OUTPATIENT
Start: 2022-12-22

## 2023-01-03 ENCOUNTER — PHARMACY VISIT (OUTPATIENT)
Dept: PHARMACY | Facility: MEDICAL CENTER | Age: 63
End: 2023-01-03
Payer: MEDICARE

## 2023-01-30 ENCOUNTER — PHARMACY VISIT (OUTPATIENT)
Dept: PHARMACY | Facility: MEDICAL CENTER | Age: 63
End: 2023-01-30
Payer: MEDICARE

## 2023-01-30 PROCEDURE — RXMED WILLOW AMBULATORY MEDICATION CHARGE: Performed by: NURSE PRACTITIONER

## 2023-03-24 ENCOUNTER — PHARMACY VISIT (OUTPATIENT)
Dept: PHARMACY | Facility: MEDICAL CENTER | Age: 63
End: 2023-03-24
Payer: MEDICARE

## 2023-03-24 PROCEDURE — RXMED WILLOW AMBULATORY MEDICATION CHARGE: Performed by: NURSE PRACTITIONER

## 2023-05-04 DIAGNOSIS — R06.02 SHORTNESS OF BREATH: ICD-10-CM

## 2023-05-04 DIAGNOSIS — E55.9 VITAMIN D INSUFFICIENCY: ICD-10-CM

## 2023-05-04 PROCEDURE — RXMED WILLOW AMBULATORY MEDICATION CHARGE: Performed by: NURSE PRACTITIONER

## 2023-05-05 ENCOUNTER — PHARMACY VISIT (OUTPATIENT)
Dept: PHARMACY | Facility: MEDICAL CENTER | Age: 63
End: 2023-05-05
Payer: MEDICARE

## 2023-05-05 RX ORDER — ERGOCALCIFEROL 1.25 MG/1
50000 CAPSULE ORAL
Qty: 12 CAPSULE | Refills: 1 | OUTPATIENT
Start: 2023-05-05

## 2023-05-05 RX ORDER — ALBUTEROL SULFATE 90 UG/1
1-2 AEROSOL, METERED RESPIRATORY (INHALATION) EVERY 4 HOURS PRN
Qty: 8.5 G | Refills: 11 | OUTPATIENT
Start: 2023-05-05

## 2023-06-16 PROCEDURE — RXMED WILLOW AMBULATORY MEDICATION CHARGE: Performed by: NURSE PRACTITIONER

## 2023-06-19 ENCOUNTER — PHARMACY VISIT (OUTPATIENT)
Dept: PHARMACY | Facility: MEDICAL CENTER | Age: 63
End: 2023-06-19
Payer: MEDICARE

## 2023-07-27 PROCEDURE — RXMED WILLOW AMBULATORY MEDICATION CHARGE: Performed by: NURSE PRACTITIONER

## 2023-08-07 ENCOUNTER — PHARMACY VISIT (OUTPATIENT)
Dept: PHARMACY | Facility: MEDICAL CENTER | Age: 63
End: 2023-08-07
Payer: MEDICARE

## 2023-12-28 ENCOUNTER — DOCUMENTATION (OUTPATIENT)
Dept: HEALTH INFORMATION MANAGEMENT | Facility: OTHER | Age: 63
End: 2023-12-28
Payer: COMMERCIAL

## 2024-01-31 ENCOUNTER — TELEPHONE (OUTPATIENT)
Dept: HEALTH INFORMATION MANAGEMENT | Facility: OTHER | Age: 64
End: 2024-01-31
Payer: COMMERCIAL

## 2024-02-13 PROCEDURE — 87040 BLOOD CULTURE FOR BACTERIA: CPT

## 2024-02-16 ENCOUNTER — HOSPITAL ENCOUNTER (INPATIENT)
Facility: MEDICAL CENTER | Age: 64
LOS: 2 days | DRG: 193 | End: 2024-02-18
Attending: STUDENT IN AN ORGANIZED HEALTH CARE EDUCATION/TRAINING PROGRAM | Admitting: STUDENT IN AN ORGANIZED HEALTH CARE EDUCATION/TRAINING PROGRAM
Payer: COMMERCIAL

## 2024-02-16 ENCOUNTER — APPOINTMENT (OUTPATIENT)
Dept: RADIOLOGY | Facility: MEDICAL CENTER | Age: 64
DRG: 193 | End: 2024-02-16
Attending: STUDENT IN AN ORGANIZED HEALTH CARE EDUCATION/TRAINING PROGRAM
Payer: COMMERCIAL

## 2024-02-16 DIAGNOSIS — J96.01 ACUTE RESPIRATORY FAILURE WITH HYPOXIA (HCC): ICD-10-CM

## 2024-02-16 DIAGNOSIS — J10.1 INFLUENZA A: ICD-10-CM

## 2024-02-16 DIAGNOSIS — J44.1 ACUTE EXACERBATION OF CHRONIC OBSTRUCTIVE PULMONARY DISEASE (COPD) (HCC): Primary | ICD-10-CM

## 2024-02-16 DIAGNOSIS — E87.1 HYPONATREMIA: ICD-10-CM

## 2024-02-16 DIAGNOSIS — R11.0 NAUSEA: ICD-10-CM

## 2024-02-16 PROBLEM — E66.01 CLASS 2 SEVERE OBESITY DUE TO EXCESS CALORIES WITH SERIOUS COMORBIDITY AND BODY MASS INDEX (BMI) OF 38.0 TO 38.9 IN ADULT (HCC): Status: ACTIVE | Noted: 2019-12-02

## 2024-02-16 PROBLEM — E87.6 HYPOKALEMIA: Status: ACTIVE | Noted: 2024-02-16

## 2024-02-16 LAB
ALBUMIN SERPL BCP-MCNC: 4 G/DL (ref 3.2–4.9)
ALBUMIN/GLOB SERPL: 1.1 G/DL
ALP SERPL-CCNC: 88 U/L (ref 30–99)
ALT SERPL-CCNC: 37 U/L (ref 2–50)
ANION GAP SERPL CALC-SCNC: 15 MMOL/L (ref 7–16)
APPEARANCE UR: CLEAR
AST SERPL-CCNC: 44 U/L (ref 12–45)
BASOPHILS # BLD AUTO: 0.4 % (ref 0–1.8)
BASOPHILS # BLD: 0.02 K/UL (ref 0–0.12)
BILIRUB SERPL-MCNC: 0.5 MG/DL (ref 0.1–1.5)
BILIRUB UR QL STRIP.AUTO: NEGATIVE
BUN SERPL-MCNC: 11 MG/DL (ref 8–22)
CALCIUM ALBUM COR SERPL-MCNC: 8.9 MG/DL (ref 8.5–10.5)
CALCIUM SERPL-MCNC: 8.9 MG/DL (ref 8.5–10.5)
CHLORIDE SERPL-SCNC: 93 MMOL/L (ref 96–112)
CO2 SERPL-SCNC: 23 MMOL/L (ref 20–33)
COLOR UR: YELLOW
CREAT SERPL-MCNC: 0.95 MG/DL (ref 0.5–1.4)
EKG IMPRESSION: NORMAL
EKG IMPRESSION: NORMAL
EOSINOPHIL # BLD AUTO: 0.06 K/UL (ref 0–0.51)
EOSINOPHIL NFR BLD: 1.1 % (ref 0–6.9)
ERYTHROCYTE [DISTWIDTH] IN BLOOD BY AUTOMATED COUNT: 40.9 FL (ref 35.9–50)
FLUAV RNA SPEC QL NAA+PROBE: POSITIVE
FLUBV RNA SPEC QL NAA+PROBE: NEGATIVE
GFR SERPLBLD CREATININE-BSD FMLA CKD-EPI: 90 ML/MIN/1.73 M 2
GLOBULIN SER CALC-MCNC: 3.7 G/DL (ref 1.9–3.5)
GLUCOSE SERPL-MCNC: 140 MG/DL (ref 65–99)
GLUCOSE UR STRIP.AUTO-MCNC: NEGATIVE MG/DL
HCT VFR BLD AUTO: 46.3 % (ref 42–52)
HGB BLD-MCNC: 16.1 G/DL (ref 14–18)
IMM GRANULOCYTES # BLD AUTO: 0.03 K/UL (ref 0–0.11)
IMM GRANULOCYTES NFR BLD AUTO: 0.6 % (ref 0–0.9)
KETONES UR STRIP.AUTO-MCNC: ABNORMAL MG/DL
LACTATE SERPL-SCNC: 1.1 MMOL/L (ref 0.5–2)
LEUKOCYTE ESTERASE UR QL STRIP.AUTO: NEGATIVE
LYMPHOCYTES # BLD AUTO: 0.94 K/UL (ref 1–4.8)
LYMPHOCYTES NFR BLD: 17.3 % (ref 22–41)
MAGNESIUM SERPL-MCNC: 1.8 MG/DL (ref 1.5–2.5)
MCH RBC QN AUTO: 30.6 PG (ref 27–33)
MCHC RBC AUTO-ENTMCNC: 34.8 G/DL (ref 32.3–36.5)
MCV RBC AUTO: 87.9 FL (ref 81.4–97.8)
MICRO URNS: ABNORMAL
MONOCYTES # BLD AUTO: 0.77 K/UL (ref 0–0.85)
MONOCYTES NFR BLD AUTO: 14.2 % (ref 0–13.4)
NEUTROPHILS # BLD AUTO: 3.6 K/UL (ref 1.82–7.42)
NEUTROPHILS NFR BLD: 66.4 % (ref 44–72)
NITRITE UR QL STRIP.AUTO: NEGATIVE
NRBC # BLD AUTO: 0 K/UL
NRBC BLD-RTO: 0 /100 WBC (ref 0–0.2)
PH UR STRIP.AUTO: 5 [PH] (ref 5–8)
PLATELET # BLD AUTO: 228 K/UL (ref 164–446)
PMV BLD AUTO: 9.9 FL (ref 9–12.9)
POTASSIUM SERPL-SCNC: 3.5 MMOL/L (ref 3.6–5.5)
PROT SERPL-MCNC: 7.7 G/DL (ref 6–8.2)
PROT UR QL STRIP: NEGATIVE MG/DL
RBC # BLD AUTO: 5.27 M/UL (ref 4.7–6.1)
RBC UR QL AUTO: NEGATIVE
RSV RNA SPEC QL NAA+PROBE: NEGATIVE
SARS-COV-2 RNA RESP QL NAA+PROBE: NOTDETECTED
SODIUM SERPL-SCNC: 131 MMOL/L (ref 135–145)
SP GR UR STRIP.AUTO: 1.01
TROPONIN T SERPL-MCNC: 13 NG/L (ref 6–19)
TROPONIN T SERPL-MCNC: 14 NG/L (ref 6–19)
UROBILINOGEN UR STRIP.AUTO-MCNC: 0.2 MG/DL
WBC # BLD AUTO: 5.4 K/UL (ref 4.8–10.8)

## 2024-02-16 PROCEDURE — A9270 NON-COVERED ITEM OR SERVICE: HCPCS | Mod: JZ | Performed by: STUDENT IN AN ORGANIZED HEALTH CARE EDUCATION/TRAINING PROGRAM

## 2024-02-16 PROCEDURE — 83605 ASSAY OF LACTIC ACID: CPT

## 2024-02-16 PROCEDURE — 84484 ASSAY OF TROPONIN QUANT: CPT

## 2024-02-16 PROCEDURE — 80053 COMPREHEN METABOLIC PANEL: CPT

## 2024-02-16 PROCEDURE — A9270 NON-COVERED ITEM OR SERVICE: HCPCS | Performed by: NURSE PRACTITIONER

## 2024-02-16 PROCEDURE — 85025 COMPLETE CBC W/AUTO DIFF WBC: CPT

## 2024-02-16 PROCEDURE — 87086 URINE CULTURE/COLONY COUNT: CPT

## 2024-02-16 PROCEDURE — 87076 CULTURE ANAEROBE IDENT EACH: CPT

## 2024-02-16 PROCEDURE — 0241U HCHG SARS-COV-2 COVID-19 NFCT DS RESP RNA 4 TRGT ED POC: CPT

## 2024-02-16 PROCEDURE — 99223 1ST HOSP IP/OBS HIGH 75: CPT | Mod: 25 | Performed by: STUDENT IN AN ORGANIZED HEALTH CARE EDUCATION/TRAINING PROGRAM

## 2024-02-16 PROCEDURE — 83735 ASSAY OF MAGNESIUM: CPT

## 2024-02-16 PROCEDURE — 94640 AIRWAY INHALATION TREATMENT: CPT

## 2024-02-16 PROCEDURE — 700101 HCHG RX REV CODE 250: Mod: UD | Performed by: STUDENT IN AN ORGANIZED HEALTH CARE EDUCATION/TRAINING PROGRAM

## 2024-02-16 PROCEDURE — 99285 EMERGENCY DEPT VISIT HI MDM: CPT

## 2024-02-16 PROCEDURE — 700102 HCHG RX REV CODE 250 W/ 637 OVERRIDE(OP): Performed by: NURSE PRACTITIONER

## 2024-02-16 PROCEDURE — 93005 ELECTROCARDIOGRAM TRACING: CPT

## 2024-02-16 PROCEDURE — 700102 HCHG RX REV CODE 250 W/ 637 OVERRIDE(OP): Mod: UD | Performed by: STUDENT IN AN ORGANIZED HEALTH CARE EDUCATION/TRAINING PROGRAM

## 2024-02-16 PROCEDURE — 94664 DEMO&/EVAL PT USE INHALER: CPT

## 2024-02-16 PROCEDURE — 700111 HCHG RX REV CODE 636 W/ 250 OVERRIDE (IP): Mod: UD | Performed by: STUDENT IN AN ORGANIZED HEALTH CARE EDUCATION/TRAINING PROGRAM

## 2024-02-16 PROCEDURE — 700101 HCHG RX REV CODE 250: Performed by: STUDENT IN AN ORGANIZED HEALTH CARE EDUCATION/TRAINING PROGRAM

## 2024-02-16 PROCEDURE — 99222 1ST HOSP IP/OBS MODERATE 55: CPT | Performed by: INTERNAL MEDICINE

## 2024-02-16 PROCEDURE — 99406 BEHAV CHNG SMOKING 3-10 MIN: CPT | Performed by: STUDENT IN AN ORGANIZED HEALTH CARE EDUCATION/TRAINING PROGRAM

## 2024-02-16 PROCEDURE — 87040 BLOOD CULTURE FOR BACTERIA: CPT

## 2024-02-16 PROCEDURE — 71045 X-RAY EXAM CHEST 1 VIEW: CPT

## 2024-02-16 PROCEDURE — 36415 COLL VENOUS BLD VENIPUNCTURE: CPT

## 2024-02-16 PROCEDURE — 99407 BEHAV CHNG SMOKING > 10 MIN: CPT

## 2024-02-16 PROCEDURE — 93005 ELECTROCARDIOGRAM TRACING: CPT | Performed by: STUDENT IN AN ORGANIZED HEALTH CARE EDUCATION/TRAINING PROGRAM

## 2024-02-16 PROCEDURE — A9270 NON-COVERED ITEM OR SERVICE: HCPCS | Mod: UD | Performed by: STUDENT IN AN ORGANIZED HEALTH CARE EDUCATION/TRAINING PROGRAM

## 2024-02-16 PROCEDURE — 99406 BEHAV CHNG SMOKING 3-10 MIN: CPT

## 2024-02-16 PROCEDURE — 94669 MECHANICAL CHEST WALL OSCILL: CPT

## 2024-02-16 PROCEDURE — 700102 HCHG RX REV CODE 250 W/ 637 OVERRIDE(OP): Mod: JZ | Performed by: STUDENT IN AN ORGANIZED HEALTH CARE EDUCATION/TRAINING PROGRAM

## 2024-02-16 PROCEDURE — 770006 HCHG ROOM/CARE - MED/SURG/GYN SEMI*

## 2024-02-16 PROCEDURE — 81003 URINALYSIS AUTO W/O SCOPE: CPT

## 2024-02-16 RX ORDER — HYDRALAZINE HYDROCHLORIDE 20 MG/ML
10 INJECTION INTRAMUSCULAR; INTRAVENOUS EVERY 4 HOURS PRN
Status: DISCONTINUED | OUTPATIENT
Start: 2024-02-16 | End: 2024-02-18 | Stop reason: HOSPADM

## 2024-02-16 RX ORDER — LISINOPRIL 10 MG/1
10 TABLET ORAL DAILY
Status: DISCONTINUED | OUTPATIENT
Start: 2024-02-16 | End: 2024-02-18 | Stop reason: HOSPADM

## 2024-02-16 RX ORDER — ATORVASTATIN CALCIUM 20 MG/1
20 TABLET, FILM COATED ORAL DAILY
COMMUNITY

## 2024-02-16 RX ORDER — IPRATROPIUM BROMIDE AND ALBUTEROL SULFATE 2.5; .5 MG/3ML; MG/3ML
3 SOLUTION RESPIRATORY (INHALATION)
Status: DISCONTINUED | OUTPATIENT
Start: 2024-02-16 | End: 2024-02-17

## 2024-02-16 RX ORDER — DOXYCYCLINE 100 MG/1
100 TABLET ORAL ONCE
Status: COMPLETED | OUTPATIENT
Start: 2024-02-16 | End: 2024-02-16

## 2024-02-16 RX ORDER — OMEPRAZOLE 20 MG/1
20 CAPSULE, DELAYED RELEASE ORAL
Status: DISCONTINUED | OUTPATIENT
Start: 2024-02-16 | End: 2024-02-18 | Stop reason: HOSPADM

## 2024-02-16 RX ORDER — DOXYCYCLINE 100 MG/1
100 CAPSULE ORAL 2 TIMES DAILY
Qty: 10 CAPSULE | Refills: 0 | Status: ACTIVE | OUTPATIENT
Start: 2024-02-16 | End: 2024-02-17

## 2024-02-16 RX ORDER — ENOXAPARIN SODIUM 100 MG/ML
40 INJECTION SUBCUTANEOUS DAILY
Status: DISCONTINUED | OUTPATIENT
Start: 2024-02-16 | End: 2024-02-18 | Stop reason: HOSPADM

## 2024-02-16 RX ORDER — ALBUTEROL SULFATE 90 UG/1
2 AEROSOL, METERED RESPIRATORY (INHALATION) EVERY 6 HOURS PRN
Qty: 8.5 G | Refills: 0 | Status: SHIPPED | OUTPATIENT
Start: 2024-02-16 | End: 2024-02-17

## 2024-02-16 RX ORDER — ALBUTEROL SULFATE 90 UG/1
1-2 AEROSOL, METERED RESPIRATORY (INHALATION) EVERY 4 HOURS PRN
Status: ON HOLD | COMMUNITY
End: 2024-02-17

## 2024-02-16 RX ORDER — OSELTAMIVIR PHOSPHATE 75 MG/1
75 CAPSULE ORAL 2 TIMES DAILY
Status: DISCONTINUED | OUTPATIENT
Start: 2024-02-16 | End: 2024-02-18 | Stop reason: HOSPADM

## 2024-02-16 RX ORDER — PREDNISONE 50 MG/1
50 TABLET ORAL DAILY
Qty: 4 TABLET | Refills: 0 | Status: SHIPPED | OUTPATIENT
Start: 2024-02-16 | End: 2024-02-17

## 2024-02-16 RX ORDER — PREDNISONE 20 MG/1
40 TABLET ORAL DAILY
Status: DISCONTINUED | OUTPATIENT
Start: 2024-02-17 | End: 2024-02-18 | Stop reason: HOSPADM

## 2024-02-16 RX ORDER — POTASSIUM CHLORIDE 20 MEQ/1
40 TABLET, EXTENDED RELEASE ORAL ONCE
Status: COMPLETED | OUTPATIENT
Start: 2024-02-16 | End: 2024-02-16

## 2024-02-16 RX ORDER — LISINOPRIL 10 MG/1
10 TABLET ORAL DAILY
COMMUNITY

## 2024-02-16 RX ORDER — ONDANSETRON 4 MG/1
4 TABLET, ORALLY DISINTEGRATING ORAL EVERY 6 HOURS PRN
Qty: 10 TABLET | Refills: 0 | Status: SHIPPED | OUTPATIENT
Start: 2024-02-16 | End: 2024-02-17

## 2024-02-16 RX ORDER — PREDNISONE 20 MG/1
60 TABLET ORAL ONCE
Status: COMPLETED | OUTPATIENT
Start: 2024-02-16 | End: 2024-02-16

## 2024-02-16 RX ORDER — PREDNISONE 20 MG/1
60 TABLET ORAL DAILY
Status: DISCONTINUED | OUTPATIENT
Start: 2024-02-16 | End: 2024-02-16

## 2024-02-16 RX ORDER — ACETAMINOPHEN 325 MG/1
650 TABLET ORAL EVERY 6 HOURS PRN
Status: DISCONTINUED | OUTPATIENT
Start: 2024-02-16 | End: 2024-02-18 | Stop reason: HOSPADM

## 2024-02-16 RX ORDER — ATORVASTATIN CALCIUM 20 MG/1
20 TABLET, FILM COATED ORAL DAILY
Status: DISCONTINUED | OUTPATIENT
Start: 2024-02-16 | End: 2024-02-18 | Stop reason: HOSPADM

## 2024-02-16 RX ORDER — ERGOCALCIFEROL 1.25 MG/1
50000 CAPSULE ORAL
COMMUNITY

## 2024-02-16 RX ADMIN — IPRATROPIUM BROMIDE AND ALBUTEROL SULFATE 3 ML: 2.5; .5 SOLUTION RESPIRATORY (INHALATION) at 18:23

## 2024-02-16 RX ADMIN — IPRATROPIUM BROMIDE AND ALBUTEROL SULFATE 3 ML: 2.5; .5 SOLUTION RESPIRATORY (INHALATION) at 05:26

## 2024-02-16 RX ADMIN — OMEPRAZOLE 20 MG: 20 CAPSULE, DELAYED RELEASE ORAL at 07:28

## 2024-02-16 RX ADMIN — IPRATROPIUM BROMIDE 0.5 MG: 0.5 SOLUTION RESPIRATORY (INHALATION) at 03:39

## 2024-02-16 RX ADMIN — Medication 1 LOZENGE: at 20:16

## 2024-02-16 RX ADMIN — IPRATROPIUM BROMIDE AND ALBUTEROL SULFATE 3 ML: 2.5; .5 SOLUTION RESPIRATORY (INHALATION) at 10:19

## 2024-02-16 RX ADMIN — OSELTAMIVIR PHOSPHATE 75 MG: 75 CAPSULE ORAL at 05:24

## 2024-02-16 RX ADMIN — DOXYCYCLINE 100 MG: 100 TABLET, FILM COATED ORAL at 05:00

## 2024-02-16 RX ADMIN — ALBUTEROL SULFATE 5 MG: 2.5 SOLUTION RESPIRATORY (INHALATION) at 03:39

## 2024-02-16 RX ADMIN — PREDNISONE 60 MG: 20 TABLET ORAL at 04:21

## 2024-02-16 RX ADMIN — OSELTAMIVIR PHOSPHATE 75 MG: 75 CAPSULE ORAL at 17:15

## 2024-02-16 RX ADMIN — IPRATROPIUM BROMIDE AND ALBUTEROL SULFATE 3 ML: 2.5; .5 SOLUTION RESPIRATORY (INHALATION) at 14:49

## 2024-02-16 RX ADMIN — POTASSIUM CHLORIDE 40 MEQ: 1500 TABLET, EXTENDED RELEASE ORAL at 05:24

## 2024-02-16 ASSESSMENT — COGNITIVE AND FUNCTIONAL STATUS - GENERAL
SUGGESTED CMS G CODE MODIFIER MOBILITY: CJ
MOBILITY SCORE: 22
DAILY ACTIVITIY SCORE: 24
STANDING UP FROM CHAIR USING ARMS: A LITTLE
CLIMB 3 TO 5 STEPS WITH RAILING: A LITTLE
SUGGESTED CMS G CODE MODIFIER DAILY ACTIVITY: CH

## 2024-02-16 ASSESSMENT — PATIENT HEALTH QUESTIONNAIRE - PHQ9
1. LITTLE INTEREST OR PLEASURE IN DOING THINGS: NOT AT ALL
SUM OF ALL RESPONSES TO PHQ9 QUESTIONS 1 AND 2: 0
2. FEELING DOWN, DEPRESSED, IRRITABLE, OR HOPELESS: NOT AT ALL
SUM OF ALL RESPONSES TO PHQ9 QUESTIONS 1 AND 2: 0
1. LITTLE INTEREST OR PLEASURE IN DOING THINGS: NOT AT ALL
2. FEELING DOWN, DEPRESSED, IRRITABLE, OR HOPELESS: NOT AT ALL

## 2024-02-16 ASSESSMENT — ENCOUNTER SYMPTOMS
SPUTUM PRODUCTION: 1
EYES NEGATIVE: 1
COUGH: 1
WEAKNESS: 1
SHORTNESS OF BREATH: 1
DIARRHEA: 0
CARDIOVASCULAR NEGATIVE: 1
NAUSEA: 0
CHILLS: 0
RESPIRATORY NEGATIVE: 1
CHILLS: 1
NEUROLOGICAL NEGATIVE: 1
ABDOMINAL PAIN: 0
MYALGIAS: 1
PSYCHIATRIC NEGATIVE: 1
FEVER: 0
VOMITING: 0
WHEEZING: 1
MUSCULOSKELETAL NEGATIVE: 1
GASTROINTESTINAL NEGATIVE: 1

## 2024-02-16 ASSESSMENT — COPD QUESTIONNAIRES
DO YOU EVER COUGH UP ANY MUCUS OR PHLEGM?: NO/ONLY WITH OCCASIONAL COLDS OR INFECTIONS
DURING THE PAST 4 WEEKS HOW MUCH DID YOU FEEL SHORT OF BREATH: NONE/LITTLE OF THE TIME
COPD SCREENING SCORE: 4
HAVE YOU SMOKED AT LEAST 100 CIGARETTES IN YOUR ENTIRE LIFE: YES

## 2024-02-16 ASSESSMENT — PAIN DESCRIPTION - PAIN TYPE
TYPE: ACUTE PAIN

## 2024-02-16 ASSESSMENT — LIFESTYLE VARIABLES
ON A TYPICAL DAY WHEN YOU DRINK ALCOHOL HOW MANY DRINKS DO YOU HAVE: 6
ALCOHOL_USE: YES
TOTAL SCORE: 4
EVER HAD A DRINK FIRST THING IN THE MORNING TO STEADY YOUR NERVES TO GET RID OF A HANGOVER: YES
AVERAGE NUMBER OF DAYS PER WEEK YOU HAVE A DRINK CONTAINING ALCOHOL: 3
DOES PATIENT WANT TO STOP DRINKING: NO
HAVE YOU EVER FELT YOU SHOULD CUT DOWN ON YOUR DRINKING: YES
EVER FELT BAD OR GUILTY ABOUT YOUR DRINKING: YES
CONSUMPTION TOTAL: POSITIVE
HOW MANY TIMES IN THE PAST YEAR HAVE YOU HAD 5 OR MORE DRINKS IN A DAY: 150
HAVE PEOPLE ANNOYED YOU BY CRITICIZING YOUR DRINKING: YES
TOTAL SCORE: 4
TOTAL SCORE: 4

## 2024-02-16 ASSESSMENT — FIBROSIS 4 INDEX: FIB4 SCORE: 1.97

## 2024-02-16 NOTE — ED NOTES
Med Rec complete per patient and pharmacy  Allergies reviewed  Antibiotics in the past 30 days:no  Anticoagulant in past 14 days:no  Pharmacy patient utilizes:[facility] [address]    Pt unable to verify names and strengths of medications    Pt states he hasn't taken his blood pressure medication the last couple days due to feeling sick    Unable to verify medications via pharmacy at this time (closed)    Addendum: verified med list with pharmacy

## 2024-02-16 NOTE — PROGRESS NOTES
Bedside report received from off going RN/tech: Alina, assumed care of patient.  POC discussed with patient. Call light within reach, all needs addressed at this time.       Fall risk interventions in place: Patient's personal possessions are with in their safe reach, Place fall risk sign on patient's door, Give patient urinal if applicable, Keep floor surfaces clean and dry, and Accompanied to restroom (all applicable per [facility] Fall risk assessment)   Continuous monitoring: Pulse Ox or Blood Pressure  IVF/IV medications: Not Applicable   Oxygen: How many liters 4L and Traced the line to wall oxygen  Bedside sitter: Not Applicable   Isolation: Isolation precautions for Droplet (Isolation order)

## 2024-02-16 NOTE — H&P
Hospital Medicine History & Physical Note    Date of Service  2/16/2024    Primary Care Physician  Jess Justice P.A.-C.    Code Status  Full Code    Chief Complaint  Chief Complaint   Patient presents with    Flu Like Symptoms     BIB EMS for flu like symptoms x 5 days. Pt reports he has been drinking alcohol as well. Pt reports he has not been able to tolerate food. Hx of COPD, [facility] EMS administered 1g of tylenol and 600 mg of ibuprofen and 4mg of zofran PO. FSBS 142. GCS 15.     Shortness of Breath     Started on Monday, pt states he is not on oxygen at home, hx of early stages of COPD. Pt hypoxic on room air at 87-88%, pt placed on 1L of oxygen NC in triage. +cough Pt reports the phlegm is very little.        History of Presenting Illness  Frederic You is a 63 y.o. male who presented 2/16/2024 with shortness of breath and flulike symptoms.  PMH of COPD, tobacco abuse, GERD, dyslipidemia, hypertension.  He's had symptoms since about 2/12 with generalized flulike symptoms, malaise and fatigue, chills, worsening cough with productive sputum.  He is also having shortness of breath and wheezing.  He says he was diagnosed with COPD about 5 years ago, on as needed albuterol but not any on long-acting inhalers.  He has never had any PFTs.    In the ED afebrile, tachycardic and tachypneic.  He was on 4-5 L O2 on my evaluation.  Labs show potassium 3.5, flu a positive.    I discussed the plan of care with patient, bedside RN, and edp .    Review of Systems  Review of Systems   Constitutional:  Positive for chills. Negative for fever.   Respiratory:  Positive for cough, sputum production, shortness of breath and wheezing.    Cardiovascular:  Negative for chest pain.   Gastrointestinal:  Negative for abdominal pain, diarrhea, nausea and vomiting.   Genitourinary:  Negative for dysuria and urgency.       Past Medical History   has a past medical history of COPD (chronic obstructive pulmonary disease) (HCC),  Seizure (HCC) (2/2013), and Skull fractures (HCC).    Surgical History   has a past surgical history that includes inguinal hernia repair (Left, 6/25/2015) and laceration repair (1979).     Family History  family history includes Diabetes in his father and mother; Hypertension in his mother.   Family history reviewed with patient. There is no family history that is pertinent to the chief complaint.     Social History   reports that he has been smoking cigarettes. He has a 38 pack-year smoking history. He has never used smokeless tobacco. He reports that he does not currently use alcohol. He reports that he does not use drugs.    Allergies  No Known Allergies    Medications  Prior to Admission Medications   Prescriptions Last Dose Informant Patient Reported? Taking?   Icosapent Ethyl (VASCEPA) 1 g Cap   No No   Sig: Take 2 capsules by mouth 2 times a day.   albuterol 108 (90 Base) MCG/ACT Aero Soln inhalation aerosol   No No   Sig: Inhale 1-2 Puffs by mouth every four hours as needed.   atorvastatin (LIPITOR) 20 MG Tab   No No   Sig: Take 1 Tablet by mouth every day.   cyclobenzaprine (FLEXERIL) 10 MG Tab   Yes No   ergocalciferol (DRISDOL) 82471 UNIT capsule   No No   Sig: Take 1 capsule by mouth every 14 days.   esomeprazole (NEXIUM 24HR) 20 MG capsule   No No   Sig: Take 1 capsule by mouth every morning before breakfast.   lisinopril (PRINIVIL) 10 MG Tab   No No   Sig: Take 1 tablet by mouth every day.   naproxen (NAPROSYN) 500 MG Tab   No No   Sig: Take 1 tablet by mouth 2 times a day, with meals.      Facility-Administered Medications: None       Physical Exam  Temp:  [37.4 °C (99.3 °F)] 37.4 °C (99.3 °F)  Pulse:  [] 79  Resp:  [20-28] 20  BP: (123-163)/() 127/79  SpO2:  [88 %-92 %] 92 %  Blood Pressure: 127/79   Temperature: 37.4 °C (99.3 °F)   Pulse: 79   Respiration: 20   Pulse Oximetry: 92 %       Physical Exam  Constitutional:       General: He is in acute distress.      Appearance: He is obese.  "  HENT:      Head: Normocephalic and atraumatic.      Mouth/Throat:      Mouth: Mucous membranes are moist.      Pharynx: Oropharynx is clear. No oropharyngeal exudate or posterior oropharyngeal erythema.   Eyes:      General: No scleral icterus.  Cardiovascular:      Rate and Rhythm: Normal rate and regular rhythm.      Pulses: Normal pulses.      Heart sounds: Normal heart sounds. No murmur heard.  Pulmonary:      Effort: Respiratory distress present.      Breath sounds: Wheezing present.   Abdominal:      Palpations: Abdomen is soft.      Tenderness: There is no abdominal tenderness.   Musculoskeletal:         General: No swelling or tenderness. Normal range of motion.      Cervical back: Normal range of motion.   Skin:     General: Skin is warm and dry.   Neurological:      General: No focal deficit present.      Mental Status: He is alert and oriented to person, place, and time. Mental status is at baseline.   Psychiatric:         Mood and Affect: Mood normal.         Laboratory:  Recent Labs     02/16/24  0156   WBC 5.4   RBC 5.27   HEMOGLOBIN 16.1   HEMATOCRIT 46.3   MCV 87.9   MCH 30.6   MCHC 34.8   RDW 40.9   PLATELETCT 228   MPV 9.9     Recent Labs     02/16/24  0156   SODIUM 131*   POTASSIUM 3.5*   CHLORIDE 93*   CO2 23   GLUCOSE 140*   BUN 11   CREATININE 0.95   CALCIUM 8.9     Recent Labs     02/16/24  0156   ALTSGPT 37   ASTSGOT 44   ALKPHOSPHAT 88   TBILIRUBIN 0.5   GLUCOSE 140*         No results for input(s): \"NTPROBNP\" in the last 72 hours.      Recent Labs     02/16/24  0156   TROPONINT 14       Imaging:  DX-CHEST-PORTABLE (1 VIEW)   Final Result         1.  Left basilar atelectasis or early infiltrate.          X-Ray:  I have personally reviewed the images and compared with prior images. and My impression is: Left basilar atelectasis  EKG:  I have personally reviewed the images and compared with prior images. and My impression is: NSR    Assessment/Plan:  Justification for Admission Status  I " anticipate this patient will require at least two midnights for appropriate medical management, necessitating inpatient admission because copd exacerbation    * COPD with acute exacerbation (HCC)- (present on admission)  Assessment & Plan  Patient has presumed history of COPD based on smoking history, has never had any PFTs done before  On presentation wheezing with shortness of breath and a cough with productive sputum  Continues smoke 1 pack a day  Will admit for COPD exacerbation secondary to influenza A infection resulting in acute hypoxemic respiratory failure  Scheduled DuoNebs, steroids, RT  Outpatient PFTs ordered    Acute respiratory failure with hypoxia (HCC)- (present on admission)  Assessment & Plan  Requiring 4-5 L O2 on my evaluation.  Secondary to COPD exacerbation due to influenza A infection    Influenza A- (present on admission)  Assessment & Plan  Started Tamiflu    Hypokalemia- (present on admission)  Assessment & Plan  Potassium 3.5 from presentation.  Replete as needed and check magnesium levels.  BMP ordered significant monitoring    Essential hypertension- (present on admission)  Assessment & Plan  Lisinopril ordered    Class 2 severe obesity due to excess calories with serious comorbidity and body mass index (BMI) of 38.0 to 38.9 in adult (HCC)- (present on admission)  Assessment & Plan  BMI 38.02    Tobacco abuse- (present on admission)  Assessment & Plan  Patient smokes 1 ppd.  Nicotine patch and/or gum denies.   I discussed cessation with patient including starting on nicotine patch and/or gum on discharge.  I also discussed medications to help with cessation with patient including Wellbutrin and Chantix, declines .  Smoking cessation discussed with patient for 4 minutes.      Gastroesophageal reflux disease without esophagitis- (present on admission)  Assessment & Plan  Continue PPI        VTE prophylaxis: enoxaparin ppx

## 2024-02-16 NOTE — PROGRESS NOTES
1225- Report received from Aminah MORIN.  1229- Pt arrived to unit with transport via [facility]. Pt ambulated from [facility] to the bed with a steady gait. Pt on 4L NC, oxygen tubing extended. Pt oriented to new room on S527-2. Assessment completed, no complaints of pain. No further needs at this time.

## 2024-02-16 NOTE — PROGRESS NOTES
4 Eyes Skin Assessment Completed by MIKEL Easton and MIKEL De Paz.    Head WDL  Ears WDL  Nose WDL  Mouth WDL  Neck WDL  Breast/Chest WDL  Shoulder Blades WDL  Spine WDL  (R) Arm/Elbow/Hand Bruising  (L) Arm/Elbow/Hand Bruising  Abdomen WDL  Groin WDL  Scrotum/Coccyx/Buttocks WDL  (R) Leg Scab  (L) Leg Scab  (R) Heel/Foot/Toe WDL  (L) Heel/Foot/Toe WDL    Interventions In Place NC W/Ear Foams and Pillows    Possible Skin Injury No    Pictures Uploaded Into Epic N/A  Wound Consult Placed N/A  RN Wound Prevention Protocol Ordered No

## 2024-02-16 NOTE — RESPIRATORY CARE
"  COPD EDUCATION by COPD CLINICAL EDUCATOR  2/16/2024  at  2:11 PM by Jenny Man, RRT     Patient interviewed by education team.  Patient declined to participate in the full program.  Therefore, a short intervention has been conducted.  A comprehensive packet including information about COPD, types of treatments to manage their disease and safe home Oxygen usage was provided and reviewed with patient at the bedside.  He states his PCP diagnosed him with COPD years ago. He has never completed PFT. Encouraged testing to defined his lung challenge.  Smoking Cessation Intervention and education completed, 15 minutes spent on smoking cessation education with patient.  Provided smoking cessation packet with \"Tips to Quit\" and brochure for \"Free Virtual Smoking Cessation Classes\".     COPD Screen  COPD Risk Screening  Do you have a history of COPD?: Yes  Do you have a Pulmonologist?: No  COPD Population Screener  During the past 4 weeks, how much did you feel short of breath?: None/Little of the time  Do you ever cough up any mucus or phlegm?: No/only with occasional colds or infections  In the past 12 months, you do less than you used to because of your breathing problems: Disagree/unsure  Have you smoked at least 100 cigarettes in your entire life?: Yes  How old are you?: 60+  COPD Screening Score: 4  COPD Coordinator Recommended: Yes    COPD Assessment  COPD Clinical Specialists ONLY  COPD Education Initiated: Yes--Short Intervention  Is this a COPD exacerbation patient?: Yes (IP Pulmonary notified)  DME Company: none prior  Physician Name: KIRAN Cooper per pt he has appt 2/16/2024  Pulmonologist Name: provided list of groups  Referrals Initiated: Yes  Smoking Cessation: Yes  $ Smoking Cessation >10 Minutes: Symptomatic  Home Health Care:  (TBD no 6 clicks)  Mobile Urgent Care Services: Declined  [facility] Group: N/A  $ Demo/Eval of SVN's, MDI's and Aerosols: Yes (provided spacer with instruction)  $ " "Bedside PFT Screen:  (flu +)    PFT Results  No results found for: \"PFT\"  (OP) Pulmonary Function Testing:  (encouraged completed outpt testing -procedure explained to pt)  Interdisciplinary Rounds: Attendance at Rounds (30 Min)    Meds to Beds  [facility] provides bedside medication delivery for all eligible patients at discharge.  Would you like to opt out of this program for any reason?: No - Stay Opted In     MY COPD ACTION PLAN     It is recommended that patients and physicians /healthcare providers complete this action plan together. This plan should be discussed at each physician visit and updated as needed.    The green, yellow and red zones show groups of symptoms of COPD. This list of symptoms is not comprehensive, and you may experience other symptoms. In the \"Actions\" column, your healthcare provider has recommended actions for you to take based on your symptoms.    Patient Name: Frederic You   YOB: 1960   Last Updated on: 2/16/2024  2:10 PM   Green Zone:  I am doing well today Actions     Usual activitiy and exercise level   Take daily medications     Usual amounts of cough and phlegm/mucus   Use oxygen as prescribed     Sleep well at night   Continue regular exercise/diet plan     Appetite is good   At all times avoid cigarette smoke, inhaled irritants     Daily Medications (these medications are taken every day):                Yellow Zone:  I am having a bad day or a COPD flare Actions     More breathless than usual   Continue daily medications     I have less energy for my daily activities   Use quick relief inhaler as ordered     Increased or thicker phlegm/mucus   Use oxygen as prescribed     Using quick relief inhaler/nebulizer more often   Get plenty of rest     Swelling of ankles more than usual   Use pursed lip breathing     More coughing than usual   At all times avoid cigarette smoke, inhaled irritants     I feel like I have a \"chest cold\"     Poor sleep and my symptoms " woke me up     My appetite is not good     My medicine is not helping      Call provider immediately if symptoms don’t improve     Continue daily medications, add rescue medications:   Albuterol 2 Puffs Every 4 hours PRN       Medications to be used during a flare up, (as Discussed with Provider):           Additional Information:  Used spacer    Red Zone:  I need urgent medical care Actions     Severe shortness of breath even at rest   Call 911 or seek medical care immediately     Not able to do any activity because of breathing      Fever or shaking chills      Feeling confused or very drowsy       Chest pains      Coughing up blood

## 2024-02-16 NOTE — ED NOTES
Bedside report received from off going RN/tech: maggi MORIN, assumed care of patient.  POC discussed with patient. Call light within reach, all needs addressed at this time.       Fall risk interventions in place: Move the patient closer to the nurse's station and Patient's personal possessions are with in their safe reach (all applicable per [place] Fall risk assessment)   Continuous monitoring: Cardiac Leads, Pulse Ox, or Blood Pressure  IVF/IV medications: Not Applicable   Oxygen: How many liters 4L  Bedside sitter: Not Applicable   Isolation: none

## 2024-02-16 NOTE — ED TRIAGE NOTES
"Chief Complaint   Patient presents with    Flu Like Symptoms     BIB EMS for flu like symptoms x 5 days. Pt reports he has been drinking alcohol as well. Pt reports he has not been able to tolerate food. Hx of COPD, [facility] EMS administered 1g of tylenol and 600 mg of ibuprofen and 4mg of zofran PO. FSBS 142. GCS 15.     Shortness of Breath     Started on Monday, pt states he is not on oxygen at home, hx of early stages of COPD. Pt hypoxic on room air at 87-88%, pt placed on 1L of oxygen NC in triage. +cough Pt reports the phlegm is very little.        63 y.o. male WC to triage for above complaint. Pt diaphoretic in triage. Protocol ordered. EKG ordered, due to pt reporting SOB and chest tenderness. GCS 15    Pt is alert and oriented, follows commands and responds appropriately to questions.      Pt placed in lobby. Pt educated on triage process. Pt encouraged to alert staff for any changes.     Patient and staff wearing appropriate PPE    BP (!) 163/100   Pulse (!) 106   Temp 37.4 °C (99.3 °F) (Temporal)   Resp (!) 28   Ht 1.727 m (5' 7.99\")   Wt 113 kg (250 lb)   SpO2 91%   BMI 38.02 kg/m²    "
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
## 2024-02-16 NOTE — ED NOTES
Pt resting comfortably in bed. Breathing unlabored. Bed in the lowest position, side rails up, Call light within reach. Updated on POC.

## 2024-02-16 NOTE — PROGRESS NOTES
[facility] Medicine Daily Progress Note    Date of Service  2/16/2024    Chief Complaint  Frederic You is a 63 y.o. male admitted 2/16/2024 with flu like symptoms, shortness of breath    Hospital Course  MrLokesh You is a 63 y.o. male who presented 2/16/2024 with shortness of breath and flulike symptoms.  Patient has a significant PMHx of COPD, tobacco abuse, GERD, dyslipidemia, hypertension, alcohol abuse.     He's had symptoms since about 2/12 with generalized flulike symptoms, malaise and fatigue, chills, worsening cough with productive sputum.  He is also having shortness of breath and wheezing.  He says he was diagnosed with COPD about 5 years ago, on as needed albuterol but not any on long-acting inhalers.  He has never had any PFTs.     In the ED afebrile, tachycardic and tachypneic.  He was on 4-5 L O2 on my evaluation.  Labs show potassium 3.5, Influenza A positive.  Patient admitted to hospital medicine for management of care.    Interval Problem Update  -Patient seen and examined.  Patient continues to have nonproductive cough.  Discussed with patient continuation of breathing treatments along with monitoring patient for hypoxia.  Patient counseled and educated in regards to smoking cessation.  Patient also endorses that he normally drinks 2 pints of vodka a day with his last drink being Sunday.  Will monitor for any withdrawal symptoms.  -Plan of care: Continue to monitor respiratory status; continue started precaution for influenza A; monitor for any signs of alcohol withdrawal.  -Disposition: Patient anticipated to stay 2-3 midnights for management of respiratory failure, COPD exacerbation, and possible CIWA  -Lab work: Reviewed; expected  -VSS at this time    I have discussed this patient's plan of care and discharge plan at IDT rounds today with Case Management, Nursing, Nursing leadership, and other members of the IDT team.    Consultants/Specialty  NONE    Code Status  Full  Code    Disposition  The patient is not medically cleared for discharge to home or a post-acute facility.  Anticipate discharge to: home with close outpatient follow-up    I have placed the appropriate orders for post-discharge needs.    Review of Systems  Review of Systems   Constitutional:  Positive for malaise/fatigue. Negative for chills and fever.   HENT: Negative.     Eyes: Negative.    Respiratory: Negative.     Cardiovascular: Negative.    Gastrointestinal: Negative.    Genitourinary: Negative.    Musculoskeletal:  Positive for myalgias.   Skin: Negative.    Neurological:  Positive for weakness.   Endo/Heme/Allergies: Negative.    Psychiatric/Behavioral: Negative.          Physical Exam  Temp:  [36.4 °C (97.5 °F)-37.4 °C (99.3 °F)] 36.4 °C (97.5 °F)  Pulse:  [] 71  Resp:  [16-28] 17  BP: ()/() 121/76  SpO2:  [69 %-94 %] 93 %    Physical Exam  Vitals and nursing note reviewed.   Constitutional:       Appearance: He is obese. He is ill-appearing.   HENT:      Head: Normocephalic.      Nose: Nose normal.      Mouth/Throat:      Mouth: Mucous membranes are moist.      Pharynx: Oropharynx is clear.   Eyes:      Pupils: Pupils are equal, round, and reactive to light.   Cardiovascular:      Rate and Rhythm: Normal rate and regular rhythm.      Pulses: Normal pulses.      Heart sounds: Normal heart sounds.   Pulmonary:      Effort: Pulmonary effort is normal.      Breath sounds: Normal breath sounds.   Abdominal:      General: Bowel sounds are normal.      Palpations: Abdomen is soft.   Musculoskeletal:         General: Tenderness present.      Cervical back: Normal range of motion and neck supple.   Skin:     General: Skin is dry.      Capillary Refill: Capillary refill takes 2 to 3 seconds.   Neurological:      Mental Status: He is alert. Mental status is at baseline.      Motor: Weakness present.         Fluids  No intake or output data in the 24 hours ending 02/16/24 1423    Laboratory  Recent  Labs     02/16/24  0156   WBC 5.4   RBC 5.27   HEMOGLOBIN 16.1   HEMATOCRIT 46.3   MCV 87.9   MCH 30.6   MCHC 34.8   RDW 40.9   PLATELETCT 228   MPV 9.9     Recent Labs     02/16/24  0156   SODIUM 131*   POTASSIUM 3.5*   CHLORIDE 93*   CO2 23   GLUCOSE 140*   BUN 11   CREATININE 0.95   CALCIUM 8.9                   Imaging  DX-CHEST-PORTABLE (1 VIEW)   Final Result         1.  Left basilar atelectasis or early infiltrate.           Assessment/Plan  * COPD with acute exacerbation (HCC)- (present on admission)  Assessment & Plan  Patient has presumed history of COPD based on smoking history, has never had any PFTs done before  On presentation wheezing with shortness of breath and a cough with productive sputum  Continues smoke 1 pack a day  Will admit for COPD exacerbation secondary to influenza A infection resulting in acute hypoxemic respiratory failure  Scheduled DuoNebs, steroids, RT  Outpatient PFTs ordered    Hypokalemia- (present on admission)  Assessment & Plan  Potassium 3.5 from presentation.  Replete as needed and check magnesium levels.  BMP ordered significant monitoring    Influenza A- (present on admission)  Assessment & Plan  Started Tamiflu    Acute respiratory failure with hypoxia (HCC)- (present on admission)  Assessment & Plan  Requiring 4-5 L O2 on my evaluation.  Secondary to COPD exacerbation due to influenza A infection    Essential hypertension- (present on admission)  Assessment & Plan  Lisinopril ordered    Class 2 severe obesity due to excess calories with serious comorbidity and body mass index (BMI) of 38.0 to 38.9 in adult (HCC)- (present on admission)  Assessment & Plan  BMI 38.02    Tobacco abuse- (present on admission)  Assessment & Plan  Patient smokes 1 ppd.  Nicotine patch and/or gum denies.   I discussed cessation with patient including starting on nicotine patch and/or gum on discharge.  I also discussed medications to help with cessation with patient including Wellbutrin and  Chantix, declines .  Smoking cessation discussed with patient for 4 minutes.      Gastroesophageal reflux disease without esophagitis- (present on admission)  Assessment & Plan  Continue PPI         VTE prophylaxis:    enoxaparin ppx      I have performed a physical exam and reviewed and updated ROS and Plan today (2/16/2024). In review of yesterday's note (2/15/2024), there are no changes except as documented above.    Please note that this dictation was created using voice recognition software. I have made every reasonable attempt to correct obvious errors, but there may be errors of grammar and possibly content that I did not discover before finalizing the note.    Electronically signed by:  Dr. MAHSA Kim, DNP, APRN, FNP-C  Hospitalist Services  [facility]  [phone]  [email or web address]  02/16/24                 [phone]

## 2024-02-16 NOTE — HOSPITAL COURSE
Mr. Frederic You is a 63 y.o. male who presented 2/16/2024 with shortness of breath and flulike symptoms.  Patient has a significant PMHx of COPD, tobacco abuse, GERD, dyslipidemia, hypertension, alcohol abuse.     He's had symptoms since about 2/12 with generalized flulike symptoms, malaise and fatigue, chills, worsening cough with productive sputum.  He is also having shortness of breath and wheezing.  He says he was diagnosed with COPD about 5 years ago, on as needed albuterol but not any on long-acting inhalers.  He has never had any PFTs.     In the ED afebrile, tachycardic and tachypneic.  He was on 4-5 L O2 on my evaluation.  Labs show potassium 3.5, Influenza A positive.  Patient admitted to hospital medicine for management of care.

## 2024-02-17 PROBLEM — E83.42 HYPOMAGNESEMIA: Status: ACTIVE | Noted: 2024-02-17

## 2024-02-17 LAB
ANION GAP SERPL CALC-SCNC: 14 MMOL/L (ref 7–16)
BASOPHILS # BLD AUTO: 0.3 % (ref 0–1.8)
BASOPHILS # BLD: 0.02 K/UL (ref 0–0.12)
BUN SERPL-MCNC: 20 MG/DL (ref 8–22)
CALCIUM SERPL-MCNC: 9 MG/DL (ref 8.5–10.5)
CHLORIDE SERPL-SCNC: 99 MMOL/L (ref 96–112)
CO2 SERPL-SCNC: 25 MMOL/L (ref 20–33)
CREAT SERPL-MCNC: 0.93 MG/DL (ref 0.5–1.4)
EOSINOPHIL # BLD AUTO: 0.01 K/UL (ref 0–0.51)
EOSINOPHIL NFR BLD: 0.1 % (ref 0–6.9)
ERYTHROCYTE [DISTWIDTH] IN BLOOD BY AUTOMATED COUNT: 42.7 FL (ref 35.9–50)
GFR SERPLBLD CREATININE-BSD FMLA CKD-EPI: 93 ML/MIN/1.73 M 2
GLUCOSE SERPL-MCNC: 136 MG/DL (ref 65–99)
HCT VFR BLD AUTO: 47.1 % (ref 42–52)
HGB BLD-MCNC: 15.8 G/DL (ref 14–18)
IMM GRANULOCYTES # BLD AUTO: 0.04 K/UL (ref 0–0.11)
IMM GRANULOCYTES NFR BLD AUTO: 0.6 % (ref 0–0.9)
LYMPHOCYTES # BLD AUTO: 1.69 K/UL (ref 1–4.8)
LYMPHOCYTES NFR BLD: 24.1 % (ref 22–41)
MAGNESIUM SERPL-MCNC: 2 MG/DL (ref 1.5–2.5)
MCH RBC QN AUTO: 30 PG (ref 27–33)
MCHC RBC AUTO-ENTMCNC: 33.5 G/DL (ref 32.3–36.5)
MCV RBC AUTO: 89.5 FL (ref 81.4–97.8)
MONOCYTES # BLD AUTO: 1.04 K/UL (ref 0–0.85)
MONOCYTES NFR BLD AUTO: 14.9 % (ref 0–13.4)
NEUTROPHILS # BLD AUTO: 4.2 K/UL (ref 1.82–7.42)
NEUTROPHILS NFR BLD: 60 % (ref 44–72)
NRBC # BLD AUTO: 0 K/UL
NRBC BLD-RTO: 0 /100 WBC (ref 0–0.2)
PHOSPHATE SERPL-MCNC: 3.5 MG/DL (ref 2.5–4.5)
PLATELET # BLD AUTO: 229 K/UL (ref 164–446)
PMV BLD AUTO: 9.5 FL (ref 9–12.9)
POTASSIUM SERPL-SCNC: 4.2 MMOL/L (ref 3.6–5.5)
PROCALCITONIN SERPL-MCNC: <0.05 NG/ML
RBC # BLD AUTO: 5.26 M/UL (ref 4.7–6.1)
SODIUM SERPL-SCNC: 138 MMOL/L (ref 135–145)
WBC # BLD AUTO: 7 K/UL (ref 4.8–10.8)

## 2024-02-17 PROCEDURE — A9270 NON-COVERED ITEM OR SERVICE: HCPCS | Performed by: STUDENT IN AN ORGANIZED HEALTH CARE EDUCATION/TRAINING PROGRAM

## 2024-02-17 PROCEDURE — 94760 N-INVAS EAR/PLS OXIMETRY 1: CPT

## 2024-02-17 PROCEDURE — 80048 BASIC METABOLIC PNL TOTAL CA: CPT

## 2024-02-17 PROCEDURE — 770006 HCHG ROOM/CARE - MED/SURG/GYN SEMI*

## 2024-02-17 PROCEDURE — 36415 COLL VENOUS BLD VENIPUNCTURE: CPT

## 2024-02-17 PROCEDURE — 94640 AIRWAY INHALATION TREATMENT: CPT

## 2024-02-17 PROCEDURE — 700102 HCHG RX REV CODE 250 W/ 637 OVERRIDE(OP): Performed by: STUDENT IN AN ORGANIZED HEALTH CARE EDUCATION/TRAINING PROGRAM

## 2024-02-17 PROCEDURE — 99232 SBSQ HOSP IP/OBS MODERATE 35: CPT | Performed by: FAMILY MEDICINE

## 2024-02-17 PROCEDURE — 85025 COMPLETE CBC W/AUTO DIFF WBC: CPT

## 2024-02-17 PROCEDURE — 700101 HCHG RX REV CODE 250: Performed by: STUDENT IN AN ORGANIZED HEALTH CARE EDUCATION/TRAINING PROGRAM

## 2024-02-17 PROCEDURE — 84145 PROCALCITONIN (PCT): CPT

## 2024-02-17 PROCEDURE — 84100 ASSAY OF PHOSPHORUS: CPT

## 2024-02-17 PROCEDURE — RXMED WILLOW AMBULATORY MEDICATION CHARGE: Performed by: FAMILY MEDICINE

## 2024-02-17 PROCEDURE — 700101 HCHG RX REV CODE 250: Performed by: FAMILY MEDICINE

## 2024-02-17 PROCEDURE — 83735 ASSAY OF MAGNESIUM: CPT

## 2024-02-17 PROCEDURE — 700111 HCHG RX REV CODE 636 W/ 250 OVERRIDE (IP): Performed by: STUDENT IN AN ORGANIZED HEALTH CARE EDUCATION/TRAINING PROGRAM

## 2024-02-17 PROCEDURE — 94669 MECHANICAL CHEST WALL OSCILL: CPT

## 2024-02-17 RX ORDER — PREDNISONE 20 MG/1
40 TABLET ORAL DAILY
Qty: 6 TABLET | Refills: 0 | Status: SHIPPED | OUTPATIENT
Start: 2024-02-17 | End: 2024-02-21

## 2024-02-17 RX ORDER — OSELTAMIVIR PHOSPHATE 75 MG/1
75 CAPSULE ORAL 2 TIMES DAILY
Qty: 6 CAPSULE | Refills: 0 | Status: ACTIVE | OUTPATIENT
Start: 2024-02-17 | End: 2024-02-21

## 2024-02-17 RX ORDER — IPRATROPIUM BROMIDE AND ALBUTEROL SULFATE 2.5; .5 MG/3ML; MG/3ML
3 SOLUTION RESPIRATORY (INHALATION)
Status: DISCONTINUED | OUTPATIENT
Start: 2024-02-17 | End: 2024-02-18

## 2024-02-17 RX ORDER — ALBUTEROL SULFATE 90 UG/1
1-2 AEROSOL, METERED RESPIRATORY (INHALATION) EVERY 4 HOURS PRN
Qty: 17 G | Refills: 2 | Status: SHIPPED | OUTPATIENT
Start: 2024-02-17

## 2024-02-17 RX ADMIN — OSELTAMIVIR PHOSPHATE 75 MG: 75 CAPSULE ORAL at 05:17

## 2024-02-17 RX ADMIN — IPRATROPIUM BROMIDE AND ALBUTEROL SULFATE 3 ML: 2.5; .5 SOLUTION RESPIRATORY (INHALATION) at 15:33

## 2024-02-17 RX ADMIN — IPRATROPIUM BROMIDE AND ALBUTEROL SULFATE 3 ML: 2.5; .5 SOLUTION RESPIRATORY (INHALATION) at 20:18

## 2024-02-17 RX ADMIN — IPRATROPIUM BROMIDE AND ALBUTEROL SULFATE 3 ML: 2.5; .5 SOLUTION RESPIRATORY (INHALATION) at 07:45

## 2024-02-17 RX ADMIN — OMEPRAZOLE 20 MG: 20 CAPSULE, DELAYED RELEASE ORAL at 08:03

## 2024-02-17 RX ADMIN — Medication 1 LOZENGE: at 05:12

## 2024-02-17 RX ADMIN — ATORVASTATIN CALCIUM 20 MG: 20 TABLET, FILM COATED ORAL at 05:12

## 2024-02-17 RX ADMIN — Medication 1 LOZENGE: at 00:24

## 2024-02-17 RX ADMIN — OSELTAMIVIR PHOSPHATE 75 MG: 75 CAPSULE ORAL at 17:26

## 2024-02-17 RX ADMIN — LISINOPRIL 10 MG: 10 TABLET ORAL at 05:12

## 2024-02-17 RX ADMIN — IPRATROPIUM BROMIDE AND ALBUTEROL SULFATE 3 ML: 2.5; .5 SOLUTION RESPIRATORY (INHALATION) at 11:33

## 2024-02-17 RX ADMIN — PREDNISONE 40 MG: 20 TABLET ORAL at 05:12

## 2024-02-17 ASSESSMENT — ENCOUNTER SYMPTOMS
DIARRHEA: 0
NECK PAIN: 0
NAUSEA: 0
SHORTNESS OF BREATH: 1
SORE THROAT: 0
MYALGIAS: 0
BLURRED VISION: 0
NERVOUS/ANXIOUS: 0
FEVER: 0
DIAPHORESIS: 0
DIZZINESS: 0
PALPITATIONS: 0
VOMITING: 0
SENSORY CHANGE: 0
BACK PAIN: 0
SPEECH CHANGE: 0
FOCAL WEAKNESS: 0
FLANK PAIN: 0
HEARTBURN: 0
COUGH: 1
CHILLS: 0
WHEEZING: 1
WEAKNESS: 1
HEADACHES: 0
ABDOMINAL PAIN: 0

## 2024-02-17 ASSESSMENT — PAIN DESCRIPTION - PAIN TYPE
TYPE: ACUTE PAIN

## 2024-02-17 ASSESSMENT — FIBROSIS 4 INDEX: FIB4 SCORE: 1.96

## 2024-02-17 NOTE — PROGRESS NOTES
"Bedside report received, Assumed care of patient 0700     Patient is A&O 4 , pt calls for assistance appropriately  Patient pain level 2/10    Patient on 2L nasal cannula  Mobility upself  Assistance level none needed  Voiding +  Last BM prior to arrival per patient stating \" not eating much\" before he came in.    Hourly rounding in place, call light within reach, bed locked in lowest position. Patient ambulating to doorway. Patient in bed when assessed, comfortably watching TV. States he is sore from coughing but declines pain medication.  "

## 2024-02-17 NOTE — CARE PLAN
The patient is Stable - Low risk of patient condition declining or worsening    Shift Goals  Clinical Goals: Lozenges, breathing tx, titrate oxygen, sleeps more than 5 hours, droplet iso  Patient Goals: lozenges, rest  Family Goals: not present    Progress made toward(s) clinical / shift goals:    Problem: Knowledge Deficit - COPD  Goal: Patient/significant other demonstrates understanding of disease process, utilization of the Action Plan, medications and discharge instruction  Outcome: Progressing     Problem: Nutrition - Advanced  Goal: Patient will display progressive weight gain toward goal have adequate food and fluid intake  Outcome: Progressing     Problem: Ineffective Airway Clearance  Goal: Patient will maintain patent airway with clear/clearing breath sounds  Outcome: Progressing     Problem: Impaired Gas Exchange  Goal: Patient will demonstrate improved ventilation and adequate oxygenation and participate in treatment regimen within the level of ability/situation.  Outcome: Progressing     Problem: Self Care  Goal: Patient will have the ability to perform ADLs independently or with assistance (bathe, groom, dress, toilet and feed)  Outcome: Progressing     Problem: Knowledge Deficit - Standard  Goal: Patient and family/care givers will demonstrate understanding of plan of care, disease process/condition, diagnostic tests and medications  Outcome: Progressing     1908 Received RN report, assumed care for this patient  Awake, resting in bed  AO x 4, pleasant and cooperative with care  Droplet isolation for flu. Productive cough noted  Denies any pain.   IV site patent and saline locked  4 lpm via nc of oxygen at 94%. Titrate to 2 lpm at 92%. Room air baseline  Mild Expiratory wheezes both upper lung lobes, RT just gave breathing treatment  Rounded abdomen, active bowels sounds all quadrants.   +UO, + passing gas, - bm  Reviewed plan of care, understood and agreed  Refused bed alarm. Calls appropriately    Education provided in using the call light for his needs and assistance  Call light and personal items within reached  Hourly rounding, kept safe and continue monitoring     Plan:  Droplet Iso for Flu  Pain mgt less than 3/10  Sleeps more than 5 hours  Patent IV site  Breathing treatment, lozenges  Free from fall, monitor v/s and labs  Titrate oxygen to room air  Steroids x 5 days    Patient is not progressing towards the following goals:

## 2024-02-17 NOTE — CARE PLAN
The patient is Stable - Low risk of patient condition declining or worsening    Shift Goals  Clinical Goals: Breathing treatments  Patient Goals: Comfort, rest  Family Goals: JENNIFER    Progress made toward(s) clinical / shift goals:  Patient and family educated on plan of care and verbalized understanding. Patient has remained free of falls this shift with appropriate fall precautions in place thorughout shift. Patient skin integrity maintained throughout shift.       Problem: Self Care  Goal: Patient will have the ability to perform ADLs independently or with assistance (bathe, groom, dress, toilet and feed)  Outcome: Progressing     Problem: Knowledge Deficit - Standard  Goal: Patient and family/care givers will demonstrate understanding of plan of care, disease process/condition, diagnostic tests and medications  Outcome: Progressing     Patient is not progressing towards the following goals:

## 2024-02-17 NOTE — CONSULTS
Pulmonary Consultation    Date of consult: 2/16/2024    Referring Physician  Beba Flores*    Reason for Consultation  copd    History of Presenting Illness  63 y.o. male who presented 2/16/2024 with no known PFTS  PMHx of COPD? tobacco abuse, GERD, dyslipidemia, hypertension, alcohol abuse.   Admitted with sob and found to be influenza A + requiring 4 l nc  CXR lll what appears to be atelectasis   Per pt given a dx of COPD and given albuterol only  Active smoker and etoh abuse      Code Status  Full Code    Review of Systems  Review of Systems   Constitutional:  Positive for malaise/fatigue.   HENT: Negative.     Eyes: Negative.    Respiratory:  Positive for cough, shortness of breath and wheezing.    Cardiovascular: Negative.    Gastrointestinal: Negative.    Genitourinary: Negative.    Musculoskeletal: Negative.    Skin: Negative.    Neurological: Negative.    Endo/Heme/Allergies: Negative.    Psychiatric/Behavioral: Negative.         Past Medical History   has a past medical history of COPD (chronic obstructive pulmonary disease) ([facility]), Seizure ([facility]ly [facility]) (2/2013), and Skull fractures (Formerly [facility]).    Surgical History   has a past surgical history that includes inguinal hernia repair (Left, 6/25/2015) and laceration repair (1979).    Family History  family history includes Diabetes in his father and mother; Hypertension in his mother.    Social History   reports that he has been smoking cigarettes. He has a 38 pack-year smoking history. He has never used smokeless tobacco. He reports that he does not currently use alcohol. He reports that he does not use drugs.    Medications  Home Medications       Reviewed by Lebron Harman (Pharmacy Tech) on 02/16/24 at 0825  Med List Status: Complete     Medication Last Dose Status   albuterol 108 (90 Base) MCG/ACT Aero Soln inhalation aerosol 2/16/2024 Active   atorvastatin (LIPITOR) 20 MG Tab 2/15/2024 Active   lisinopril (PRINIVIL) 10 MG Tab COUPLE DAYS AG Active    vitamin D2, Ergocalciferol, (DRISDOL) 1.25 MG (16670 UT) Cap capsule UNK Active                  Current Facility-Administered Medications   Medication Dose Route Frequency Provider Last Rate Last Admin    Respiratory Therapy Consult   Nebulization Continuous RT Cyrus Garcia M.D.        ipratropium-albuterol (DUONEB) nebulizer solution  3 mL Nebulization Q4HRS (RT) Cyrus Garcia M.D.   3 mL at 02/16/24 1823    ipratropium-albuterol (DUONEB) nebulizer solution  3 mL Nebulization Q2HRS PRN (RT) Cyrus Garcia M.D.        acetaminophen (Tylenol) tablet 650 mg  650 mg Oral Q6HRS PRN Cyrus Garcia M.D.        enoxaparin (Lovenox) inj 40 mg  40 mg Subcutaneous DAILY AT 1800 Cyrus Garcia M.D.        hydrALAZINE (Apresoline) injection 10 mg  10 mg Intravenous Q4HRS PRN Cyrus Garcia M.D.        [START ON 2/17/2024] predniSONE (Deltasone) tablet 40 mg  40 mg Oral DAILY Cyrus Garcia M.D.        oseltamivir (Tamiflu) capsule 75 mg  75 mg Oral BID Cyrus Garcia M.D.   75 mg at 02/16/24 1715    atorvastatin (Lipitor) tablet 20 mg  20 mg Oral DAILY Cyrus Garcia M.D.        omeprazole (PriLOSEC) capsule 20 mg  [facility]   20 mg at 02/16/24 0728    lisinopril (Prinivil) tablet 10 mg  10 mg Oral DAILY Cyrus Garcia M.D.           Allergies  No Known Allergies    Vital Signs last 24 hours  Temp:  [35.9 °C (96.7 °F)-37.4 °C (99.3 °F)] 35.9 °C (96.7 °F)  Pulse:  [] 77  Resp:  [16-28] 18  BP: ()/() 132/74  SpO2:  [69 %-96 %] 94 %    Physical Exam  Physical Exam  Constitutional:       Appearance: He is obese. He is ill-appearing.   HENT:      Mouth/Throat:      Mouth: Mucous membranes are moist.   Eyes:      Extraocular Movements: Extraocular movements intact.      Pupils: Pupils are equal, round, and reactive to light.   Cardiovascular:      Rate and Rhythm: Normal rate.   Pulmonary:      Breath sounds: No wheezing.   Skin:     General: Skin is warm and dry.    Neurological:      General: No focal deficit present.      Mental Status: He is oriented to person, place, and time.   Psychiatric:         Mood and Affect: Mood normal.         Behavior: Behavior normal.         Thought Content: Thought content normal.         Judgment: Judgment normal.         Fluids    Intake/Output Summary (Last 24 hours) at 2024  Last data filed at 2024 1500  Gross per 24 hour   Intake 120 ml   Output --   Net 120 ml       Laboratory  Recent Results (from the past 48 hour(s))   EKG (NOW)    Collection Time: 24  1:15 AM   Result Value Ref Range    Report       [facility]    Test Date:  2024  Pt Name:    CAMI PETTIT           Department: ER  MRN:        [id number]                      Room:  Gender:     Male                         Technician: [id number]  :        1960                   Requested By:ER TRIAGE PROTOCOL  Order #:    [id number]                    Reading MD:    Measurements  Intervals                                Axis  Rate:       88                           P:          51  IA:         151                          QRS:        67  QRSD:       87                           T:          60  QT:         366  QTc:        443    Interpretive Statements  Sinus rhythm  Baseline wander in lead(s) V3,V4,V5  Compared to ECG 2015 14:48:21  No significant changes     POC CoV-2, FLU A/B, RSV by PCR    Collection Time: 24  1:50 AM   Result Value Ref Range    POC Influenza A RNA, PCR POSITIVE (A) Negative    POC Influenza B RNA, PCR Negative Negative    POC RSV, by PCR Negative Negative    POC SARS-CoV-2, PCR NotDetected    Lactic Acid    Collection Time: 24  1:56 AM   Result Value Ref Range    Lactic Acid 1.1 0.5 - 2.0 mmol/L   CBC with Differential    Collection Time: 24  1:56 AM   Result Value Ref Range    WBC 5.4 4.8 - 10.8 K/uL    RBC 5.27 4.70 - 6.10 M/uL    Hemoglobin 16.1 14.0 - 18.0 g/dL     Hematocrit 46.3 42.0 - 52.0 %    MCV 87.9 81.4 - 97.8 fL    MCH 30.6 27.0 - 33.0 pg    MCHC 34.8 32.3 - 36.5 g/dL    RDW 40.9 35.9 - 50.0 fL    Platelet Count 228 164 - 446 K/uL    MPV 9.9 9.0 - 12.9 fL    Neutrophils-Polys 66.40 44.00 - 72.00 %    Lymphocytes 17.30 (L) 22.00 - 41.00 %    Monocytes 14.20 (H) 0.00 - 13.40 %    Eosinophils 1.10 0.00 - 6.90 %    Basophils 0.40 0.00 - 1.80 %    Immature Granulocytes 0.60 0.00 - 0.90 %    Nucleated RBC 0.00 0.00 - 0.20 /100 WBC    Neutrophils (Absolute) 3.60 1.82 - 7.42 K/uL    Lymphs (Absolute) 0.94 (L) 1.00 - 4.80 K/uL    Monos (Absolute) 0.77 0.00 - 0.85 K/uL    Eos (Absolute) 0.06 0.00 - 0.51 K/uL    Baso (Absolute) 0.02 0.00 - 0.12 K/uL    Immature Granulocytes (abs) 0.03 0.00 - 0.11 K/uL    NRBC (Absolute) 0.00 K/uL   Complete Metabolic Panel    Collection Time: 02/16/24  1:56 AM   Result Value Ref Range    Sodium 131 (L) 135 - 145 mmol/L    Potassium 3.5 (L) 3.6 - 5.5 mmol/L    Chloride 93 (L) 96 - 112 mmol/L    Co2 23 20 - 33 mmol/L    Anion Gap 15.0 7.0 - 16.0    Glucose 140 (H) 65 - 99 mg/dL    Bun 11 8 - 22 mg/dL    Creatinine 0.95 0.50 - 1.40 mg/dL    Calcium 8.9 8.5 - 10.5 mg/dL    Correct Calcium 8.9 8.5 - 10.5 mg/dL    AST(SGOT) 44 12 - 45 U/L    ALT(SGPT) 37 2 - 50 U/L    Alkaline Phosphatase 88 30 - 99 U/L    Total Bilirubin 0.5 0.1 - 1.5 mg/dL    Albumin 4.0 3.2 - 4.9 g/dL    Total Protein 7.7 6.0 - 8.2 g/dL    Globulin 3.7 (H) 1.9 - 3.5 g/dL    A-G Ratio 1.1 g/dL   Troponins NOW    Collection Time: 02/16/24  1:56 AM   Result Value Ref Range    Troponin T 14 6 - 19 ng/L   ESTIMATED GFR    Collection Time: 02/16/24  1:56 AM   Result Value Ref Range    GFR (CKD-EPI) 90 >60 mL/min/1.73 m 2   MAGNESIUM    Collection Time: 02/16/24  1:56 AM   Result Value Ref Range    Magnesium 1.8 1.5 - 2.5 mg/dL   Troponins in two (2) hours    Collection Time: 02/16/24  5:58 AM   Result Value Ref Range    Troponin T 13 6 - 19 ng/L   Urinalysis    Collection Time: 02/16/24   8:37 AM    Specimen: Urine   Result Value Ref Range    Color Yellow     Character Clear     Specific Gravity 1.013 <1.035    Ph 5.0 5.0 - 8.0    Glucose Negative Negative mg/dL    Ketones Trace (A) Negative mg/dL    Protein Negative Negative mg/dL    Bilirubin Negative Negative    Urobilinogen, Urine 0.2 Negative    Nitrite Negative Negative    Leukocyte Esterase Negative Negative    Occult Blood Negative Negative    Micro Urine Req see below           Assessment/Plan   #Influenza A   Unsure if pt has copd or not as no PFTs  No imaging to support emphysema   Agree treating with 5 day steroid as as with smoking likely reactive component with Influenza A  Target sats 89-92%  High risk of etoh abuse    Should get PFTs outpt  Smoking cessation counseling given 5 mins

## 2024-02-17 NOTE — CARE PLAN
The patient is Stable - Low risk of patient condition declining or worsening    Shift Goals  Clinical Goals: Patient oxygen will be titrated down to 1L while oxygen saturation will remain above 89%  Patient Goals: rest  Family Goals: hayes    Progress made toward(s) clinical / shift goals:      Problem: Knowledge Deficit - COPD  Goal: Patient/significant other demonstrates understanding of disease process, utilization of the Action Plan, medications and discharge instruction  Outcome: Progressing     Problem: Risk for Infection - COPD  Goal: Patient will remain free from signs and symptoms of infection  Outcome: Progressing     Problem: Self Care  Goal: Patient will have the ability to perform ADLs independently or with assistance (bathe, groom, dress, toilet and feed)  Outcome: Progressing     Problem: Pain - Standard  Goal: Alleviation of pain or a reduction in pain to the patient’s comfort goal  Outcome: Progressing       Patient was seen twice by respiratory this shift for duoneb treatments. His lungs sounds have improved from expiratory wheezes to diminished. Patient remains free of infection looking at improving and stable lab results, no signs or symptoms of infection nor any complaints from the patient. Patient is upself and able to ambulate to the bathroom and perform self care as needed. Patient's pain has remained 0/10 for this shift. Patient will need reinforcement in regards to knowledge of COPD however his knowledge has improved by verbalizing understanding of disease process.

## 2024-02-17 NOTE — PROGRESS NOTES
Hospital Medicine Daily Progress Note    Date of Service  2/17/2024    Chief Complaint  Frederic You is a 63 y.o. male admitted 2/16/2024 with influenza    Hospital Course  Admitted with influenza, respiratory failure with hypoxia, questionable COPD exacerbation.  Pulmonary medicine was consulted in case.  He was started on prednisone, RT protocol, oxygen supplementation, Tamiflu.    Interval Problem Update  Respiratory failure - O2 2 lpm  Influenza - feels a little better  Hypertension - sbp 103-149    I have discussed this patient's plan of care and discharge plan at IDT rounds today with Case Management, Nursing, Nursing leadership, and other members of the IDT team.    Consultants/Specialty  pulmonary    Code Status  Full Code    Disposition  The patient is not medically cleared for discharge to home or a post-acute facility.  Anticipate discharge to: home with close outpatient follow-up    I have placed the appropriate orders for post-discharge needs.    Review of Systems  Review of Systems   Constitutional:  Positive for malaise/fatigue. Negative for chills, diaphoresis and fever.   HENT:  Positive for congestion. Negative for hearing loss and sore throat.    Eyes:  Negative for blurred vision.   Respiratory:  Positive for cough, shortness of breath and wheezing.    Cardiovascular:  Negative for chest pain, palpitations and leg swelling.   Gastrointestinal:  Negative for abdominal pain, diarrhea, heartburn, nausea and vomiting.   Genitourinary:  Negative for dysuria, flank pain and hematuria.   Musculoskeletal:  Negative for back pain, joint pain, myalgias and neck pain.   Skin:  Negative for rash.   Neurological:  Positive for weakness. Negative for dizziness, sensory change, speech change, focal weakness and headaches.   Psychiatric/Behavioral:  The patient is not nervous/anxious.         Physical Exam  Temp:  [35.9 °C (96.7 °F)-36.8 °C (98.3 °F)] 36.7 °C (98.1 °F)  Pulse:  [44-93] 71  Resp:   [17-18] 17  BP: (103-149)/(57-80) 104/58  SpO2:  [69 %-98 %] 92 %    Physical Exam  Vitals and nursing note reviewed.   Constitutional:       Appearance: He is obese.   HENT:      Head: Normocephalic and atraumatic.      Nose: No congestion.      Mouth/Throat:      Mouth: Mucous membranes are moist.   Eyes:      Extraocular Movements: Extraocular movements intact.      Conjunctiva/sclera: Conjunctivae normal.   Cardiovascular:      Rate and Rhythm: Normal rate and regular rhythm.   Pulmonary:      Effort: Pulmonary effort is normal.      Breath sounds: Decreased air movement present. Wheezing and rhonchi present.   Abdominal:      General: There is no distension.      Tenderness: There is no abdominal tenderness. There is no guarding or rebound.   Musculoskeletal:      Cervical back: No tenderness.      Right lower leg: No edema.      Left lower leg: No edema.   Skin:     General: Skin is warm and dry.   Neurological:      General: No focal deficit present.      Mental Status: He is alert and oriented to person, place, and time.      Cranial Nerves: No cranial nerve deficit.         Fluids    Intake/Output Summary (Last 24 hours) at 2/17/2024 1136  Last data filed at 2/17/2024 1000  Gross per 24 hour   Intake 460 ml   Output --   Net 460 ml       Laboratory  Recent Labs     02/16/24  0156 02/17/24  0311   WBC 5.4 7.0   RBC 5.27 5.26   HEMOGLOBIN 16.1 15.8   HEMATOCRIT 46.3 47.1   MCV 87.9 89.5   MCH 30.6 30.0   MCHC 34.8 33.5   RDW 40.9 42.7   PLATELETCT 228 229   MPV 9.9 9.5     Recent Labs     02/16/24  0156 02/17/24  0311   SODIUM 131* 138   POTASSIUM 3.5* 4.2   CHLORIDE 93* 99   CO2 23 25   GLUCOSE 140* 136*   BUN 11 20   CREATININE 0.95 0.93   CALCIUM 8.9 9.0                   Imaging  DX-CHEST-PORTABLE (1 VIEW)   Final Result         1.  Left basilar atelectasis or early infiltrate.           Assessment/Plan  Influenza A- (present on admission)  Assessment & Plan  Tamiflu  Supportive measures    Acute  respiratory failure with hypoxia (HCC)- (present on admission)  Assessment & Plan  RT protocol  Prednisone    Hypomagnesemia- (present on admission)  Assessment & Plan  Follow level    Hypokalemia- (present on admission)  Assessment & Plan  Follow BMP    Essential hypertension- (present on admission)  Assessment & Plan  Lisinopril    Class 2 severe obesity due to excess calories with serious comorbidity and body mass index (BMI) of 38.0 to 38.9 in adult (HCC)- (present on admission)  Assessment & Plan  Body mass index is 38.02 kg/m².     Tobacco abuse- (present on admission)  Assessment & Plan  Refused nicotine replacement protocol      Gastroesophageal reflux disease without esophagitis- (present on admission)  Assessment & Plan  Omeprazole         VTE prophylaxis:    enoxaparin ppx      I have performed a physical exam and reviewed and updated ROS and Plan today (2/17/2024). In review of yesterday's note (2/16/2024), there are no changes except as documented above.

## 2024-02-17 NOTE — PROGRESS NOTES
1908 Received RN report, assumed care for this patient  Awake, resting in bed  AO x 4, pleasant and cooperative with care  Droplet isolation for flu. Productive cough noted  Denies any pain.   IV site patent and saline locked  4 lpm via nc of oxygen at 94%. Titrate to 2 lpm at 92%. Room air baseline  Mild Expiratory wheezes both upper lung lobes, RT just gave breathing treatment  Rounded abdomen, active bowels sounds all quadrants.   +UO, + passing gas, - bm  Reviewed plan of care, understood and agreed  Refused bed alarm. Calls appropriately   Education provided in using the call light for his needs and assistance  Call light and personal items within reached  Hourly rounding, kept safe and continue monitoring     Plan:  Droplet Iso for Flu  Pain mgt less than 3/10  Sleeps more than 5 hours  Patent IV site  Breathing treatment, lozenges  Free from fall, monitor v/s and labs  Titrate oxygen to room air  Steroids x 5 days

## 2024-02-17 NOTE — ASSESSMENT & PLAN NOTE
Body mass index is 38.02 kg/m².   
Follow BMP  
Follow level  
Lisinopril  
Omeprazole  
Patient has presumed history of COPD based on smoking history, has never had any PFTs done before  On presentation wheezing with shortness of breath and a cough with productive sputum  Continues smoke 1 pack a day  Will admit for COPD exacerbation secondary to influenza A infection resulting in acute hypoxemic respiratory failure  Scheduled DuoNebs, steroids, RT  Outpatient PFTs ordered  
RT protocol  Prednisone  
Refused nicotine replacement protocol    
Tamiflu  Supportive measures  
General

## 2024-02-18 ENCOUNTER — PHARMACY VISIT (OUTPATIENT)
Dept: PHARMACY | Facility: MEDICAL CENTER | Age: 64
End: 2024-02-18
Payer: COMMERCIAL

## 2024-02-18 VITALS
OXYGEN SATURATION: 91 % | SYSTOLIC BLOOD PRESSURE: 117 MMHG | WEIGHT: 237.44 LBS | HEIGHT: 68 IN | BODY MASS INDEX: 35.99 KG/M2 | DIASTOLIC BLOOD PRESSURE: 70 MMHG | RESPIRATION RATE: 16 BRPM | HEART RATE: 70 BPM | TEMPERATURE: 97.1 F

## 2024-02-18 PROBLEM — R73.03 PREDIABETES: Status: ACTIVE | Noted: 2024-02-18

## 2024-02-18 LAB
ANION GAP SERPL CALC-SCNC: 11 MMOL/L (ref 7–16)
BACTERIA UR CULT: NORMAL
BUN SERPL-MCNC: 23 MG/DL (ref 8–22)
CALCIUM SERPL-MCNC: 8.8 MG/DL (ref 8.5–10.5)
CHLORIDE SERPL-SCNC: 100 MMOL/L (ref 96–112)
CO2 SERPL-SCNC: 24 MMOL/L (ref 20–33)
CREAT SERPL-MCNC: 0.92 MG/DL (ref 0.5–1.4)
ERYTHROCYTE [DISTWIDTH] IN BLOOD BY AUTOMATED COUNT: 44.9 FL (ref 35.9–50)
GFR SERPLBLD CREATININE-BSD FMLA CKD-EPI: 94 ML/MIN/1.73 M 2
GLUCOSE SERPL-MCNC: 164 MG/DL (ref 65–99)
HCT VFR BLD AUTO: 46.3 % (ref 42–52)
HGB BLD-MCNC: 15.3 G/DL (ref 14–18)
MAGNESIUM SERPL-MCNC: 1.9 MG/DL (ref 1.5–2.5)
MCH RBC QN AUTO: 30.3 PG (ref 27–33)
MCHC RBC AUTO-ENTMCNC: 33 G/DL (ref 32.3–36.5)
MCV RBC AUTO: 91.7 FL (ref 81.4–97.8)
PLATELET # BLD AUTO: 217 K/UL (ref 164–446)
PMV BLD AUTO: 9.4 FL (ref 9–12.9)
POTASSIUM SERPL-SCNC: 4 MMOL/L (ref 3.6–5.5)
RBC # BLD AUTO: 5.05 M/UL (ref 4.7–6.1)
SIGNIFICANT IND 70042: NORMAL
SITE SITE: NORMAL
SODIUM SERPL-SCNC: 135 MMOL/L (ref 135–145)
SOURCE SOURCE: NORMAL
WBC # BLD AUTO: 8.5 K/UL (ref 4.8–10.8)

## 2024-02-18 PROCEDURE — 700111 HCHG RX REV CODE 636 W/ 250 OVERRIDE (IP): Performed by: STUDENT IN AN ORGANIZED HEALTH CARE EDUCATION/TRAINING PROGRAM

## 2024-02-18 PROCEDURE — A9270 NON-COVERED ITEM OR SERVICE: HCPCS | Performed by: STUDENT IN AN ORGANIZED HEALTH CARE EDUCATION/TRAINING PROGRAM

## 2024-02-18 PROCEDURE — RXMED WILLOW AMBULATORY MEDICATION CHARGE: Performed by: FAMILY MEDICINE

## 2024-02-18 PROCEDURE — 99239 HOSP IP/OBS DSCHRG MGMT >30: CPT | Performed by: FAMILY MEDICINE

## 2024-02-18 PROCEDURE — 36415 COLL VENOUS BLD VENIPUNCTURE: CPT

## 2024-02-18 PROCEDURE — 700102 HCHG RX REV CODE 250 W/ 637 OVERRIDE(OP): Performed by: STUDENT IN AN ORGANIZED HEALTH CARE EDUCATION/TRAINING PROGRAM

## 2024-02-18 PROCEDURE — 80048 BASIC METABOLIC PNL TOTAL CA: CPT

## 2024-02-18 PROCEDURE — 83735 ASSAY OF MAGNESIUM: CPT

## 2024-02-18 PROCEDURE — 85027 COMPLETE CBC AUTOMATED: CPT

## 2024-02-18 RX ORDER — IPRATROPIUM BROMIDE AND ALBUTEROL SULFATE 2.5; .5 MG/3ML; MG/3ML
3 SOLUTION RESPIRATORY (INHALATION)
Status: DISCONTINUED | OUTPATIENT
Start: 2024-02-18 | End: 2024-02-18 | Stop reason: HOSPADM

## 2024-02-18 RX ADMIN — Medication 1 LOZENGE: at 07:41

## 2024-02-18 RX ADMIN — ATORVASTATIN CALCIUM 20 MG: 20 TABLET, FILM COATED ORAL at 05:00

## 2024-02-18 RX ADMIN — LISINOPRIL 10 MG: 10 TABLET ORAL at 05:01

## 2024-02-18 RX ADMIN — PREDNISONE 40 MG: 20 TABLET ORAL at 05:00

## 2024-02-18 RX ADMIN — OMEPRAZOLE 20 MG: 20 CAPSULE, DELAYED RELEASE ORAL at 05:01

## 2024-02-18 RX ADMIN — OSELTAMIVIR PHOSPHATE 75 MG: 75 CAPSULE ORAL at 05:00

## 2024-02-18 ASSESSMENT — PAIN DESCRIPTION - PAIN TYPE
TYPE: ACUTE PAIN

## 2024-02-18 NOTE — CARE PLAN
The patient is Stable - Low risk of patient condition declining or worsening    Shift Goals  Clinical Goals: Pt will remain on room air until discharge tomorrow, satting higher than 89%  Patient Goals: go home tomorrow      Progress made toward(s) clinical / shift goals:  Pt has remained on room air for the night.    Patient is not progressing towards the following goals:

## 2024-02-18 NOTE — CARE PLAN
The patient is Stable - Low risk of patient condition declining or worsening    Shift Goals  Clinical Goals: DC  Patient Goals: DC  Family Goals: n/a at this time    Progress made toward(s) clinical / shift goals:  met     Problem: Knowledge Deficit - COPD  Goal: Patient/significant other demonstrates understanding of disease process, utilization of the Action Plan, medications and discharge instruction  Outcome: Met     Problem: Risk for Infection - COPD  Goal: Patient will remain free from signs and symptoms of infection  Outcome: Met     Problem: Nutrition - Advanced  Goal: Patient will display progressive weight gain toward goal have adequate food and fluid intake  Outcome: Met     Problem: Ineffective Airway Clearance  Goal: Patient will maintain patent airway with clear/clearing breath sounds  Outcome: Met     Problem: Impaired Gas Exchange  Goal: Patient will demonstrate improved ventilation and adequate oxygenation and participate in treatment regimen within the level of ability/situation.  Outcome: Met     Problem: Risk for Aspiration  Goal: Patient's risk for aspiration will be absent or decrease  Outcome: Met     Problem: Self Care  Goal: Patient will have the ability to perform ADLs independently or with assistance (bathe, groom, dress, toilet and feed)  Outcome: Met     Problem: Knowledge Deficit - Standard  Goal: Patient and family/care givers will demonstrate understanding of plan of care, disease process/condition, diagnostic tests and medications  Outcome: Met     Problem: Pain - Standard  Goal: Alleviation of pain or a reduction in pain to the patient’s comfort goal  Outcome: Met       Patient is not progressing towards the following goals:

## 2024-02-18 NOTE — PROGRESS NOTES
Rec'd report from day shift RN. Assumed pt care. Assessment completed. AA&OX4. Denies pain at this time. No s/s of discomfort or distress. Pt is up self, ambulates to the bathroom and maintains steady gait. Pt denies SOB with ambulation, no wheezes with auscultation. Bed in lowest position, bed locked, treaded socks in place, RN and CNA numbers provided, call light within reach.

## 2024-02-18 NOTE — DISCHARGE SUMMARY
Discharge Summary    CHIEF COMPLAINT ON ADMISSION  Chief Complaint   Patient presents with    Flu Like Symptoms     [facility] for flu like symptoms x 5 days. Pt reports he has been drinking alcohol as well. Pt reports he has not been able to tolerate food. Hx of COPD, [facility] administered 1g of tylenol and 600 mg of ibuprofen and 4mg of zofran PO. FSBS 142. GCS 15.     Shortness of Breath     Started on Monday, pt states he is not on oxygen at home, hx of early stages of COPD. Pt hypoxic on room air at 87-88%, pt placed on 1L of oxygen NC in triage. +cough Pt reports the phlegm is very little.        Reason for Admission  Flu like symptoms     Admission Date  2/16/2024    CODE STATUS  Prior    HPI & HOSPITAL COURSE  This is a 63 y.o. male here with influenza, respiratory failure with hypoxia, questionable COPD exacerbation.  Pulmonary medicine was consulted on the case.  He was started on prednisone, RT protocol, oxygen supplementation, Tamiflu.  He had marked clinical improvement of his symptoms.  He was able to wean off oxygen supplementation.  Patient has been advised to stop smoking.  He will need to follow-up with his PCP to set up PFTs to be done as outpatient.    Therefore, he is discharged in good and stable condition to home with close outpatient follow-up.    The patient met 2-midnight criteria for an inpatient stay at the time of discharge.    Discharge Date  2/18/2024    FOLLOW UP ITEMS POST DISCHARGE  Follow-up as below    DISCHARGE DIAGNOSES  Active Problems:    Acute respiratory failure with hypoxia (HCC) (POA: Yes)    Influenza A (POA: Yes)    Essential hypertension (POA: Yes)    Hypokalemia (POA: Yes)    Hypomagnesemia (POA: Yes)    Prediabetes (POA: Unknown)    Gastroesophageal reflux disease without esophagitis (POA: Yes)    Tobacco abuse (POA: Yes)    Class 2 severe obesity due to excess calories with serious comorbidity and body mass index (BMI) of 38.0 to 38.9 in adult (HCC) (POA: Yes)  Resolved  Problems:    * No resolved hospital problems. *      FOLLOW UP  No future appointments.  Jess Justice P.A.-C.  [address]  [address]  [place] [id number] [phone]          Alessandra Cortés M.D.  [address]  [address]  [place] [id number] [phone]    Follow up        MEDICATIONS ON DISCHARGE     Medication List        START taking these medications        Instructions   oseltamivir 75 MG Caps  Commonly known as: Tamiflu   Take 1 Capsule by mouth 2 times a day for 3 days.  Dose: 75 mg     predniSONE 20 MG Tabs  Commonly known as: Deltasone   Take 2 Tablets by mouth every day for 3 days.  Dose: 40 mg            CONTINUE taking these medications        Instructions   albuterol 108 (90 Base) MCG/ACT Aers inhalation aerosol   Inhale 1-2 Puffs every four hours as needed for Shortness of Breath.  Dose: 1-2 Puff     atorvastatin 20 MG Tabs  Commonly known as: Lipitor   Take 20 mg by mouth every day.  Dose: 20 mg     lisinopril 10 MG Tabs  Commonly known as: Prinivil   Take 10 mg by mouth every day.  Dose: 10 mg     vitamin D2 (Ergocalciferol) 1.25 MG (35053 UT) Caps capsule  Commonly known as: Drisdol   Take 50,000 Units by mouth every 14 days.  Dose: 50,000 Units              Allergies  No Known Allergies    DIET  No orders of the defined types were placed in this encounter.      ACTIVITY  As tolerated.  Weight bearing as tolerated    CONSULTATIONS  Pulmonary medicine    PROCEDURES  None    LABORATORY  Lab Results   Component Value Date    SODIUM 135 02/18/2024    POTASSIUM 4.0 02/18/2024    CHLORIDE 100 02/18/2024    CO2 24 02/18/2024    GLUCOSE 164 (H) 02/18/2024    BUN 23 (H) 02/18/2024    CREATININE 0.92 02/18/2024        Lab Results   Component Value Date    WBC 8.5 02/18/2024    HEMOGLOBIN 15.3 02/18/2024    HEMATOCRIT 46.3 02/18/2024    PLATELETCT 217 02/18/2024        Total time of the discharge process exceeds 35 minutes.

## 2024-02-21 LAB
BACTERIA BLD CULT: NORMAL
SIGNIFICANT IND 70042: NORMAL
SITE SITE: NORMAL
SOURCE SOURCE: NORMAL

## 2024-02-22 LAB
BACTERIA BLD CULT: ABNORMAL
BACTERIA BLD CULT: ABNORMAL
SIGNIFICANT IND 70042: ABNORMAL
SITE SITE: ABNORMAL
SOURCE SOURCE: ABNORMAL

## 2024-04-12 ENCOUNTER — HOSPITAL ENCOUNTER (OUTPATIENT)
Dept: LAB | Facility: MEDICAL CENTER | Age: 64
End: 2024-04-12
Payer: COMMERCIAL

## 2024-04-12 LAB
25(OH)D3 SERPL-MCNC: 29 NG/ML (ref 30–100)
BASOPHILS # BLD AUTO: 0.7 % (ref 0–1.8)
BASOPHILS # BLD: 0.09 K/UL (ref 0–0.12)
CHOLEST SERPL-MCNC: 118 MG/DL (ref 100–199)
EOSINOPHIL # BLD AUTO: 0.37 K/UL (ref 0–0.51)
EOSINOPHIL NFR BLD: 3.1 % (ref 0–6.9)
ERYTHROCYTE [DISTWIDTH] IN BLOOD BY AUTOMATED COUNT: 44.4 FL (ref 35.9–50)
FOLATE SERPL-MCNC: 16.2 NG/ML
HCT VFR BLD AUTO: 48.9 % (ref 42–52)
HDLC SERPL-MCNC: 34 MG/DL
HGB BLD-MCNC: 15.9 G/DL (ref 14–18)
IMM GRANULOCYTES # BLD AUTO: 0.09 K/UL (ref 0–0.11)
IMM GRANULOCYTES NFR BLD AUTO: 0.7 % (ref 0–0.9)
LDLC SERPL CALC-MCNC: 48 MG/DL
LYMPHOCYTES # BLD AUTO: 2.95 K/UL (ref 1–4.8)
LYMPHOCYTES NFR BLD: 24.4 % (ref 22–41)
MCH RBC QN AUTO: 30.2 PG (ref 27–33)
MCHC RBC AUTO-ENTMCNC: 32.5 G/DL (ref 32.3–36.5)
MCV RBC AUTO: 93 FL (ref 81.4–97.8)
MONOCYTES # BLD AUTO: 1.03 K/UL (ref 0–0.85)
MONOCYTES NFR BLD AUTO: 8.5 % (ref 0–13.4)
NEUTROPHILS # BLD AUTO: 7.58 K/UL (ref 1.82–7.42)
NEUTROPHILS NFR BLD: 62.6 % (ref 44–72)
NRBC # BLD AUTO: 0 K/UL
NRBC BLD-RTO: 0 /100 WBC (ref 0–0.2)
PLATELET # BLD AUTO: 307 K/UL (ref 164–446)
PMV BLD AUTO: 10.5 FL (ref 9–12.9)
RBC # BLD AUTO: 5.26 M/UL (ref 4.7–6.1)
TRIGL SERPL-MCNC: 182 MG/DL (ref 0–149)
VIT B12 SERPL-MCNC: 743 PG/ML (ref 211–911)
WBC # BLD AUTO: 12.1 K/UL (ref 4.8–10.8)

## 2024-04-12 PROCEDURE — 80061 LIPID PANEL: CPT

## 2024-04-12 PROCEDURE — 82306 VITAMIN D 25 HYDROXY: CPT

## 2024-04-12 PROCEDURE — 36415 COLL VENOUS BLD VENIPUNCTURE: CPT

## 2024-04-12 PROCEDURE — 82607 VITAMIN B-12: CPT

## 2024-04-12 PROCEDURE — 82746 ASSAY OF FOLIC ACID SERUM: CPT

## 2024-04-12 PROCEDURE — 85025 COMPLETE CBC W/AUTO DIFF WBC: CPT
